# Patient Record
Sex: FEMALE | Race: WHITE | NOT HISPANIC OR LATINO | Employment: FULL TIME | URBAN - METROPOLITAN AREA
[De-identification: names, ages, dates, MRNs, and addresses within clinical notes are randomized per-mention and may not be internally consistent; named-entity substitution may affect disease eponyms.]

---

## 2017-01-10 ENCOUNTER — COMMUNICATION - HEALTHEAST (OUTPATIENT)
Dept: FAMILY MEDICINE | Facility: CLINIC | Age: 47
End: 2017-01-10

## 2017-01-10 DIAGNOSIS — R11.0 NAUSEA: ICD-10-CM

## 2017-01-20 ENCOUNTER — COMMUNICATION - HEALTHEAST (OUTPATIENT)
Dept: FAMILY MEDICINE | Facility: CLINIC | Age: 47
End: 2017-01-20

## 2017-01-20 DIAGNOSIS — R11.0 NAUSEA: ICD-10-CM

## 2017-04-03 ENCOUNTER — OFFICE VISIT - HEALTHEAST (OUTPATIENT)
Dept: FAMILY MEDICINE | Facility: CLINIC | Age: 47
End: 2017-04-03

## 2017-04-03 DIAGNOSIS — G43.909 MIGRAINE HEADACHE: ICD-10-CM

## 2017-04-03 DIAGNOSIS — R11.0 NAUSEA: ICD-10-CM

## 2017-04-08 ENCOUNTER — COMMUNICATION - HEALTHEAST (OUTPATIENT)
Dept: FAMILY MEDICINE | Facility: CLINIC | Age: 47
End: 2017-04-08

## 2017-04-08 DIAGNOSIS — G43.909 MIGRAINE HEADACHE: ICD-10-CM

## 2017-10-20 ENCOUNTER — HOSPITAL ENCOUNTER (OUTPATIENT)
Dept: MAMMOGRAPHY | Facility: CLINIC | Age: 47
Discharge: HOME | End: 2017-10-20
Attending: NURSE PRACTITIONER

## 2017-10-20 DIAGNOSIS — Z12.31 VISIT FOR SCREENING MAMMOGRAM: ICD-10-CM

## 2018-04-02 ENCOUNTER — COMMUNICATION - HEALTHEAST (OUTPATIENT)
Dept: FAMILY MEDICINE | Facility: CLINIC | Age: 48
End: 2018-04-02

## 2018-04-02 DIAGNOSIS — G43.909 MIGRAINE HEADACHE: ICD-10-CM

## 2018-04-02 DIAGNOSIS — R11.0 NAUSEA: ICD-10-CM

## 2018-04-09 ENCOUNTER — COMMUNICATION - HEALTHEAST (OUTPATIENT)
Dept: FAMILY MEDICINE | Facility: CLINIC | Age: 48
End: 2018-04-09

## 2018-04-09 DIAGNOSIS — G43.909 MIGRAINE HEADACHE: ICD-10-CM

## 2018-07-17 ENCOUNTER — COMMUNICATION - HEALTHEAST (OUTPATIENT)
Dept: FAMILY MEDICINE | Facility: CLINIC | Age: 48
End: 2018-07-17

## 2018-07-17 DIAGNOSIS — R11.0 NAUSEA: ICD-10-CM

## 2018-09-21 ENCOUNTER — HOSPITAL ENCOUNTER (OUTPATIENT)
Dept: MAMMOGRAPHY | Facility: CLINIC | Age: 48
Discharge: HOME | End: 2018-09-21
Attending: NURSE PRACTITIONER

## 2018-09-21 DIAGNOSIS — Z12.31 VISIT FOR SCREENING MAMMOGRAM: ICD-10-CM

## 2019-04-11 ENCOUNTER — COMMUNICATION - HEALTHEAST (OUTPATIENT)
Dept: FAMILY MEDICINE | Facility: CLINIC | Age: 49
End: 2019-04-11

## 2019-04-11 DIAGNOSIS — G43.909 MIGRAINE HEADACHE: ICD-10-CM

## 2019-07-26 ENCOUNTER — OFFICE VISIT - HEALTHEAST (OUTPATIENT)
Dept: FAMILY MEDICINE | Facility: CLINIC | Age: 49
End: 2019-07-26

## 2019-07-26 DIAGNOSIS — R11.0 NAUSEA: ICD-10-CM

## 2019-07-26 DIAGNOSIS — G47.00 INSOMNIA, UNSPECIFIED TYPE: ICD-10-CM

## 2019-07-26 DIAGNOSIS — Z00.00 HEALTHCARE MAINTENANCE: ICD-10-CM

## 2019-07-26 DIAGNOSIS — G43.909 MIGRAINE HEADACHE: ICD-10-CM

## 2019-07-26 DIAGNOSIS — F43.25 ADJUSTMENT REACTION WITH MIXED DISTURBANCE OF EMOTIONS AND CONDUCT: ICD-10-CM

## 2019-07-26 DIAGNOSIS — Z92.29 HISTORY OF POSTMENOPAUSAL HRT: ICD-10-CM

## 2019-07-26 DIAGNOSIS — Z11.4 SCREENING FOR HIV (HUMAN IMMUNODEFICIENCY VIRUS): ICD-10-CM

## 2019-07-26 DIAGNOSIS — D17.30 LIPOMA OF SKIN AND SUBCUTANEOUS TISSUE: ICD-10-CM

## 2019-07-26 LAB
25(OH)D3 SERPL-MCNC: 33.2 NG/ML (ref 30–80)
25(OH)D3 SERPL-MCNC: 33.2 NG/ML (ref 30–80)
ALBUMIN SERPL-MCNC: 4.4 G/DL (ref 3.5–5)
ALP SERPL-CCNC: 35 U/L (ref 45–120)
ALT SERPL W P-5'-P-CCNC: 16 U/L (ref 0–45)
ANION GAP SERPL CALCULATED.3IONS-SCNC: 7 MMOL/L (ref 5–18)
AST SERPL W P-5'-P-CCNC: 17 U/L (ref 0–40)
BILIRUB SERPL-MCNC: 0.5 MG/DL (ref 0–1)
BUN SERPL-MCNC: 19 MG/DL (ref 8–22)
CALCIUM SERPL-MCNC: 10.3 MG/DL (ref 8.5–10.5)
CHLORIDE BLD-SCNC: 108 MMOL/L (ref 98–107)
CHOLEST SERPL-MCNC: 154 MG/DL
CO2 SERPL-SCNC: 26 MMOL/L (ref 22–31)
CREAT SERPL-MCNC: 0.76 MG/DL (ref 0.6–1.1)
ERYTHROCYTE [DISTWIDTH] IN BLOOD BY AUTOMATED COUNT: 11.1 % (ref 11–14.5)
FASTING STATUS PATIENT QL REPORTED: YES
GFR SERPL CREATININE-BSD FRML MDRD: >60 ML/MIN/1.73M2
GLUCOSE BLD-MCNC: 98 MG/DL (ref 70–125)
HCT VFR BLD AUTO: 41 % (ref 35–47)
HDLC SERPL-MCNC: 61 MG/DL
HGB BLD-MCNC: 13.8 G/DL (ref 12–16)
HIV 1+2 AB+HIV1 P24 AG SERPL QL IA: NEGATIVE
LDLC SERPL CALC-MCNC: 85 MG/DL
MCH RBC QN AUTO: 32.4 PG (ref 27–34)
MCHC RBC AUTO-ENTMCNC: 33.6 G/DL (ref 32–36)
MCV RBC AUTO: 96 FL (ref 80–100)
PLATELET # BLD AUTO: 170 THOU/UL (ref 140–440)
PMV BLD AUTO: 9.7 FL (ref 7–10)
POTASSIUM BLD-SCNC: 4.5 MMOL/L (ref 3.5–5)
PROT SERPL-MCNC: 6.9 G/DL (ref 6–8)
RBC # BLD AUTO: 4.25 MILL/UL (ref 3.8–5.4)
SODIUM SERPL-SCNC: 141 MMOL/L (ref 136–145)
TRIGL SERPL-MCNC: 41 MG/DL
TSH SERPL DL<=0.005 MIU/L-ACNC: 1.35 UIU/ML (ref 0.3–5)
WBC: 4.8 THOU/UL (ref 4–11)

## 2019-07-26 ASSESSMENT — MIFFLIN-ST. JEOR: SCORE: 1330.55

## 2019-07-29 ENCOUNTER — COMMUNICATION - HEALTHEAST (OUTPATIENT)
Dept: FAMILY MEDICINE | Facility: CLINIC | Age: 49
End: 2019-07-29

## 2019-07-29 DIAGNOSIS — F32.9 CURRENT EPISODE OF MAJOR DEPRESSIVE DISORDER WITHOUT PRIOR EPISODE, UNSPECIFIED DEPRESSION EPISODE SEVERITY: ICD-10-CM

## 2019-07-29 DIAGNOSIS — F41.1 GENERALIZED ANXIETY DISORDER: ICD-10-CM

## 2019-08-29 ENCOUNTER — COMMUNICATION - HEALTHEAST (OUTPATIENT)
Dept: FAMILY MEDICINE | Facility: CLINIC | Age: 49
End: 2019-08-29

## 2019-08-29 DIAGNOSIS — F41.1 GENERALIZED ANXIETY DISORDER: ICD-10-CM

## 2019-08-29 DIAGNOSIS — F32.9 CURRENT EPISODE OF MAJOR DEPRESSIVE DISORDER WITHOUT PRIOR EPISODE, UNSPECIFIED DEPRESSION EPISODE SEVERITY: ICD-10-CM

## 2019-08-30 ENCOUNTER — COMMUNICATION - HEALTHEAST (OUTPATIENT)
Dept: FAMILY MEDICINE | Facility: CLINIC | Age: 49
End: 2019-08-30

## 2019-08-30 DIAGNOSIS — R11.0 NAUSEA: ICD-10-CM

## 2019-09-26 ENCOUNTER — COMMUNICATION - HEALTHEAST (OUTPATIENT)
Dept: FAMILY MEDICINE | Facility: CLINIC | Age: 49
End: 2019-09-26

## 2019-09-26 DIAGNOSIS — F41.1 GENERALIZED ANXIETY DISORDER: ICD-10-CM

## 2019-09-26 DIAGNOSIS — F32.9 CURRENT EPISODE OF MAJOR DEPRESSIVE DISORDER WITHOUT PRIOR EPISODE, UNSPECIFIED DEPRESSION EPISODE SEVERITY: ICD-10-CM

## 2019-11-27 ENCOUNTER — COMMUNICATION - HEALTHEAST (OUTPATIENT)
Dept: FAMILY MEDICINE | Facility: CLINIC | Age: 49
End: 2019-11-27

## 2019-11-27 DIAGNOSIS — G47.00 INSOMNIA, UNSPECIFIED TYPE: ICD-10-CM

## 2019-11-27 DIAGNOSIS — F32.A DEPRESSION, UNSPECIFIED DEPRESSION TYPE: ICD-10-CM

## 2019-11-27 DIAGNOSIS — F41.9 ANXIETY: ICD-10-CM

## 2019-12-13 ENCOUNTER — COMMUNICATION - HEALTHEAST (OUTPATIENT)
Dept: FAMILY MEDICINE | Facility: CLINIC | Age: 49
End: 2019-12-13

## 2019-12-13 DIAGNOSIS — F32.A DEPRESSION, UNSPECIFIED DEPRESSION TYPE: ICD-10-CM

## 2019-12-13 DIAGNOSIS — F41.9 ANXIETY: ICD-10-CM

## 2020-03-12 ENCOUNTER — COMMUNICATION - HEALTHEAST (OUTPATIENT)
Dept: FAMILY MEDICINE | Facility: CLINIC | Age: 50
End: 2020-03-12

## 2020-03-12 DIAGNOSIS — G47.00 INSOMNIA, UNSPECIFIED TYPE: ICD-10-CM

## 2020-04-01 ENCOUNTER — COMMUNICATION - HEALTHEAST (OUTPATIENT)
Dept: FAMILY MEDICINE | Facility: CLINIC | Age: 50
End: 2020-04-01

## 2020-04-01 DIAGNOSIS — G43.909 MIGRAINE HEADACHE: ICD-10-CM

## 2020-05-07 ENCOUNTER — COMMUNICATION - HEALTHEAST (OUTPATIENT)
Dept: FAMILY MEDICINE | Facility: CLINIC | Age: 50
End: 2020-05-07

## 2020-05-07 DIAGNOSIS — Z92.29 HISTORY OF POSTMENOPAUSAL HRT: ICD-10-CM

## 2020-05-07 DIAGNOSIS — G47.00 INSOMNIA, UNSPECIFIED TYPE: ICD-10-CM

## 2020-05-07 DIAGNOSIS — F41.9 ANXIETY: ICD-10-CM

## 2020-05-07 DIAGNOSIS — R11.0 NAUSEA: ICD-10-CM

## 2020-05-07 DIAGNOSIS — G43.909 MIGRAINE HEADACHE: ICD-10-CM

## 2020-05-07 DIAGNOSIS — F32.A DEPRESSION, UNSPECIFIED DEPRESSION TYPE: ICD-10-CM

## 2020-05-07 RX ORDER — ONDANSETRON 4 MG/1
TABLET, FILM COATED ORAL
Qty: 30 TABLET | Refills: 11 | Status: SHIPPED | OUTPATIENT
Start: 2020-05-07 | End: 2021-09-09

## 2020-05-07 RX ORDER — HYDROXYZINE HYDROCHLORIDE 25 MG/1
TABLET, FILM COATED ORAL
Qty: 90 TABLET | Refills: 1 | Status: SHIPPED | OUTPATIENT
Start: 2020-05-07 | End: 2021-09-09

## 2020-05-07 RX ORDER — ESTRADIOL 0.05 MG/D
PATCH, EXTENDED RELEASE TRANSDERMAL
Qty: 24 PATCH | Refills: 3 | Status: SHIPPED | OUTPATIENT
Start: 2020-05-07 | End: 2021-08-23

## 2020-05-08 ENCOUNTER — COMMUNICATION - HEALTHEAST (OUTPATIENT)
Dept: FAMILY MEDICINE | Facility: CLINIC | Age: 50
End: 2020-05-08

## 2020-05-08 DIAGNOSIS — G43.909 MIGRAINE HEADACHE: ICD-10-CM

## 2020-05-08 RX ORDER — ZOLMITRIPTAN 5 MG/1
TABLET, FILM COATED ORAL
Qty: 36 TABLET | Refills: 3 | Status: SHIPPED | OUTPATIENT
Start: 2020-05-08 | End: 2021-07-19

## 2020-09-04 ENCOUNTER — COMMUNICATION - HEALTHEAST (OUTPATIENT)
Dept: FAMILY MEDICINE | Facility: CLINIC | Age: 50
End: 2020-09-04

## 2020-09-04 ENCOUNTER — HOSPITAL ENCOUNTER (OUTPATIENT)
Dept: MAMMOGRAPHY | Facility: CLINIC | Age: 50
Discharge: HOME | End: 2020-09-04

## 2020-09-04 DIAGNOSIS — F41.9 ANXIETY: ICD-10-CM

## 2020-09-04 DIAGNOSIS — Z12.31 VISIT FOR SCREENING MAMMOGRAM: ICD-10-CM

## 2020-09-04 DIAGNOSIS — F32.A DEPRESSION, UNSPECIFIED DEPRESSION TYPE: ICD-10-CM

## 2020-09-06 RX ORDER — ESCITALOPRAM OXALATE 20 MG/1
TABLET ORAL
Qty: 90 TABLET | Refills: 3 | Status: SHIPPED | OUTPATIENT
Start: 2020-09-06 | End: 2021-09-09

## 2021-02-22 ENCOUNTER — RECORDS - HEALTHEAST (OUTPATIENT)
Dept: LAB | Facility: CLINIC | Age: 51
End: 2021-02-22

## 2021-02-24 LAB
GAMMA INTERFERON BACKGROUND BLD IA-ACNC: 0.09 IU/ML
M TB IFN-G BLD-IMP: NEGATIVE
MITOGEN IGNF BCKGRD COR BLD-ACNC: 0.05 IU/ML
MITOGEN IGNF BCKGRD COR BLD-ACNC: 0.06 IU/ML
QTF INTERPRETATION: NORMAL
QTF MITOGEN - NIL: 7.71 IU/ML

## 2021-05-25 ENCOUNTER — RECORDS - HEALTHEAST (OUTPATIENT)
Dept: ADMINISTRATIVE | Facility: CLINIC | Age: 51
End: 2021-05-25

## 2021-05-27 NOTE — TELEPHONE ENCOUNTER
RN cannot approve Refill Request    RN can NOT refill this medication PCP messaged that patient is overdue for Office Visit. Last office visit: 4/3/2017 Edith Bahena MD Last Physical: Visit date not found Last MTM visit: Visit date not found Last visit same specialty: 4/3/2017 Edith Bahena MD.  Next visit within 3 mo: Visit date not found  Next physical within 3 mo: Visit date not found      Cindy Sneed, [facility] Triage/Med Refill 4/11/2019    Requested Prescriptions   Pending Prescriptions Disp Refills     ZOLMitriptan (ZOMIG) 5 MG tablet [Pharmacy Med Name: ZOLMITRIPTAN 5MG TABLETS] 12 tablet 0     Sig: TAKE ONE TABLET BY MOUTH EVERY 2 HOURS AS NEEDED FOR MIGRAINE. MAX OF 2 TABLETS DAILY       Triptans Refill Protocol Failed - 4/11/2019  3:13 AM        Failed - PCP or prescribing provider visit in past 12 months       Last office visit with prescriber/PCP: 4/3/2017 Edith Bahena MD OR same dept: Visit date not found OR same specialty: 4/3/2017 Edith Bahena MD  Last physical: Visit date not found Last MTM visit: Visit date not found   Next visit within 3 mo: Visit date not found  Next physical within 3 mo: Visit date not found  Prescriber OR PCP: Edith Bahena MD  Last diagnosis associated with med order: 1. Migraine headache  - ZOLMitriptan (ZOMIG) 5 MG tablet [Pharmacy Med Name: ZOLMITRIPTAN 5MG TABLETS]; TAKE ONE TABLET BY MOUTH EVERY 2 HOURS AS NEEDED FOR MIGRAINE. MAX OF 2 TABLETS DAILY  Dispense: 12 tablet; Refill: 0    If protocol passes may refill for 12 months if within 3 months of last provider visit (or a total of 15 months).

## 2021-05-30 ENCOUNTER — RECORDS - HEALTHEAST (OUTPATIENT)
Dept: ADMINISTRATIVE | Facility: CLINIC | Age: 51
End: 2021-05-30

## 2021-05-30 VITALS — WEIGHT: 143.13 LBS | BODY MASS INDEX: 23.82 KG/M2

## 2021-05-30 NOTE — PROGRESS NOTES
Assessment/Plan:     1. Migraine headache  ZOLMitriptan (ZOMIG) 5 MG tablet    DISCONTINUED: ZOLMitriptan (ZOMIG) 5 MG tablet    DISCONTINUED: ZOLMitriptan (ZOMIG) 5 MG tablet   2. Screening for HIV (human immunodeficiency virus)  HIV Antigen/Antibody Screening [facility]   3. Nausea  ondansetron (ZOFRAN) 4 MG tablet   4. Insomnia, unspecified type  hydrOXYzine HCl (ATARAX) 25 MG tablet   5. History of postmenopausal HRT  estradiol (VIVELLE-DOT) 0.05 mg/24 hr   6. 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 Lipoma of skin and subcutaneous tissue         Diagnoses and all orders for this visit:    Migraine headache  -     ZOLMitriptan (ZOMIG) 5 MG tablet; Take 1 tablet (5 mg total) by mouth every 2 (two) hours as needed for migraine (Maximum of 2 tablets per day.).  Dispense: 12 tablet; Refill: 11  -Refill provided on Zomig.  Recommend follow-up in 1 year    Screening for HIV (human immunodeficiency virus)  -     HIV Antigen/Antibody Screening [place]    Nausea  -     ondansetron (ZOFRAN) 4 MG tablet; TAKE 1 TABLET BY MOUTH EVERY 8 HOURS AS NEEDED FOR NAUSEA OR VOMITING  Dispense: 30 tablet; Refill: 11    Insomnia, unspecified type  -     hydrOXYzine HCl (ATARAX) 25 MG tablet; Take 1 tablet every 6 hours as needed for anxiety/sleep  Dispense: 30 tablet; Refill: 1  -Counseled on use of medication and side effects.  She will also continue to use melatonin as needed encouraged her to seek a group support and/or counseling.  Provided emotional support today in clinic    History of postmenopausal HRT  -     estradiol (VIVELLE-DOT) 0.05 mg/24 hr; SOHAN 1 PA EXT TO THE SKIN 2 TIMES A WEEK  Dispense: 24 patch; Refill: 3  -She will follow-up with gynecologist in September    Healthcare maintenance  -     Lipid [place]  FASTING  -     Comprehensive Metabolic Panel  -     Thyroid Stimulating Hormone (TSH)  -     Vitamin D, Total (25-Hydroxy)  -     HM2(CBC w/o Differential)    Adjustment reaction with mixed disturbance of emotions and conduct  Provided emotional support.  Encouraged her to consider support group/ or individual counseling for grief.    Lipoma of skin and subcutaneous tissue  Discussed she could see general surgeon to discuss excision of lipomas             Subjective:      Alisia Garcia is a 48 y.o. female who comes in today for medication check and refill.  She has not been seen since April 2017.  Reviewed her health history.  She has history of migraine headaches.  Frequency of occurrence is variable.  Historically they have been associated with triggers such as increased stress and lack of sleep.  She is followed by a gynecologist who has her on estradiol patch.  Starting the patch is also made a big difference in her migraine headaches.  When migraines occur, she is Zomig.  She frequently experiences nausea with her migraines, so she also uses Zofran to help relieve the nausea.  No adverse side effects with the medications and they remain effective.  She has been having more migraines frequently because she has been under increased stress and not sleeping well.  She reports that her mother passed away in March.  Her mother developed lung cancer and by the time the cancer was found it had already metastasized to her brain.  Patient took time off of work to care for her mother in her home and her mother passed away in her own home.  Patient becomes tearful when discussing this.  She reports that she is still going through the grieving process.  Patient also reports that a few weeks ago her son who is 23 years old attempted suicide and is now an inpatient at [facility].  She is not seeing a therapist.  Feels that she is able to deal with her emotions and that they are normal therefore what someone who is  grieving should be experiencing.  She has started taking melatonin for sleep.  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  She does not want any medications that would make her groggy.  We had discussed doing lab work when she comes in for her next office visit when she was seen in 2017.  She is fasting today and agrees to lab work.  On review of systems she also reports some concern about presence of multiple lipomas on both of her arms.  He seemed to be increasing in size and they do cause her some discomfort.  She wonders how they could be removed.  She is otherwise feeling well and review of systems is otherwise negative.  Medications and allergies are reviewed and updated.    Current Outpatient Medications   Medication Sig Dispense Refill     estradiol (VIVELLE-DOT) 0.05 mg/24 hr SOHAN 1 PA EXT TO THE SKIN 2 TIMES A WEEK 24 patch 3     MULTIVITAMIN ORAL Take 1 capsule by mouth daily.       ondansetron (ZOFRAN) 4 MG tablet TAKE 1 TABLET BY MOUTH EVERY 8 HOURS AS NEEDED FOR NAUSEA OR VOMITING 30 tablet 11     ZOLMitriptan (ZOMIG) 5 MG tablet Take 1 tablet (5 mg total) by mouth every 2 (two) hours as needed for migraine (Maximum of 2 tablets per day.). 12 tablet 11     hydrOXYzine HCl (ATARAX) 25 MG tablet Take 1 tablet every 6 hours as needed for anxiety/sleep 30 tablet 1     No current facility-administered medications for this visit.        Past Medical History, Family History, and Social History reviewed.  No past medical history on file.  Past Surgical History:   Procedure Laterality Date     BREAST BIOPSY Right 2008     HYSTERECTOMY  10/2009    Partial     OH TOTAL ABDOM HYSTERECTOMY      Description: Total Abdominal Hysterectomy;  Recorded: 02/28/2012;     Patient has no known allergies.  Family History   Problem Relation Age of Onset     Diabetes Father      Breast cancer  "Maternal Aunt 38     Cancer Mother         lung cancer     Social History     Socioeconomic History     Marital status:      Spouse name: Not on file     Number of children: Not on file     Years of education: Not on file     Highest education level: Not on file   Occupational History     Not on file   Social Needs     Financial resource strain: Not on file     Food insecurity:     Worry: Not on file     Inability: Not on file     Transportation needs:     Medical: Not on file     Non-medical: Not on file   Tobacco Use     Smoking status: Former Smoker     Smokeless tobacco: Never Used     Tobacco comment: quit 22 yrs ago   Substance and Sexual Activity     Alcohol use: Yes     Alcohol/week: 0.6 oz     Types: 1 Glasses of wine per week     Comment: rare      Drug use: No     Sexual activity: Not on file     Comment: hyst   Lifestyle     Physical activity:     Days per week: Not on file     Minutes per session: Not on file     Stress: Not on file   Relationships     Social connections:     Talks on phone: Not on file     Gets together: Not on file     Attends Christian service: Not on file     Active member of club or organization: Not on file     Attends meetings of clubs or organizations: Not on file     Relationship status: Not on file     Intimate partner violence:     Fear of current or ex partner: Not on file     Emotionally abused: Not on file     Physically abused: Not on file     Forced sexual activity: Not on file   Other Topics Concern     Not on file   Social History Narrative     Not on file         Review of systems is as stated in HPI, and the remainder of the 10 system review is otherwise negative.    Objective:     Vitals:    07/26/19 0941   BP: 117/68   Patient Site: Left Arm   Patient Position: Sitting   Cuff Size: Adult Regular   Pulse: 60   Temp: 98  F (36.7  C)   TempSrc: Oral   SpO2: 98%   Weight: 154 lb 4 oz (70 kg)   Height: 5' 5\" (1.651 m)    Body mass index is 25.67 kg/m .    General " appearance: alert, appears stated age and cooperative  Head: Normocephalic, without obvious abnormality, atraumatic  Lungs: clear to auscultation bilaterally  Heart: regular rate and rhythm, no murmur, occasional extra beat  Extremities: extremities normal, atraumatic, no cyanosis or edema  Skin: multiple lipomas present on arms  Neurologic: Grossly normal   Psych: normal mood, slightly irritable, tearful at times            This note has been dictated using voice recognition software. Any grammatical or context distortions are unintentional and inherent to the the software.

## 2021-05-31 ENCOUNTER — RECORDS - HEALTHEAST (OUTPATIENT)
Dept: ADMINISTRATIVE | Facility: CLINIC | Age: 51
End: 2021-05-31

## 2021-06-01 NOTE — TELEPHONE ENCOUNTER
Refill Approved    Rx renewed per Medication Renewal Policy. Medication was last renewed on 8/30/19.    Katt Garcia, [facility] Triage/Med Refill 9/27/2019     Requested Prescriptions   Pending Prescriptions Disp Refills     escitalopram oxalate (LEXAPRO) 10 MG tablet [Pharmacy Med Name: ESCITALOPRAM 10MG TABLETS] 90 tablet 2     Sig: TAKE 1 TABLET(10 MG) BY MOUTH DAILY       SSRI Refill Protocol  Passed - 9/26/2019  9:00 PM        Passed - PCP or prescribing provider visit in last year     Last office visit with prescriber/PCP: 7/26/2019 Edith Bahena MD OR same dept: 7/26/2019 Edith Bahena MD OR same specialty: 7/26/2019 Edith Bahena MD  Last physical: Visit date not found Last MTM visit: Visit date not found   Next visit within 3 mo: Visit date not found  Next physical within 3 mo: Visit date not found  Prescriber OR PCP: Edith Bahena MD  Last diagnosis associated with med order: 1. Generalized anxiety disorder  - escitalopram oxalate (LEXAPRO) 10 MG tablet [Pharmacy Med Name: ESCITALOPRAM 10MG TABLETS]; TAKE 1 TABLET(10 MG) BY MOUTH DAILY  Dispense: 90 tablet; Refill: 2    2. Current episode of major depressive disorder without prior episode, unspecified depression episode severity  - escitalopram oxalate (LEXAPRO) 10 MG tablet [Pharmacy Med Name: ESCITALOPRAM 10MG TABLETS]; TAKE 1 TABLET(10 MG) BY MOUTH DAILY  Dispense: 90 tablet; Refill: 2    If protocol passes may refill for 12 months if within 3 months of last provider visit (or a total of 15 months).

## 2021-06-03 VITALS — HEIGHT: 65 IN | BODY MASS INDEX: 25.7 KG/M2 | WEIGHT: 154.25 LBS

## 2021-06-09 NOTE — PROGRESS NOTES
Assessment  1. Migraine headache  ZOLMitriptan (ZOMIG) 5 MG tablet   2. Nausea  ondansetron (ZOFRAN) 4 MG tablet       Plan    1.  Migraine headaches: Refill provided on Zomig.  Follow-up in 1 year.  2.  Nausea: Refill provided on Zofran.  Follow-up in 1 year.  3.  Healthcare maintenance: No longer needs Pap as she had hysterectomy.  Vaccines up-to-date.  Fasting labs last year were normal.  She declines recheck this year.  Suggest recheck of lipids and glucose in 1-2 years      Subjective  Alisia Garcia is a 46 y.o. female comes in today for medication check and refill.  She has history of migraine headaches.  Frequency of occurrence is variable.  Historically have been associated with triggers such as increased stress and lack of sleep.  Since she was seen last year for medication check, she did have her yearly cycle with her gynecologist.  She was started on an estradiol patch.  She reports that this has significantly helped with a lot of her symptoms.  She was having concerns with weight gain and fatigue.  The hormones have helped with that.  She is getting better rest.  She is having less frequent migraine headaches.  Overall feels great and has no particular concerns or questions today.  When migraines occur, she uses Zomig.  She frequently experiences nausea with her migraines so she uses Zofran for this.  No adverse side effects with medications and they remain effective.  Review of systems is otherwise negative.  She has not had any other changes in health or family history.  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  No other concerns today.    Current Outpatient Prescriptions   Medication Sig Dispense Refill     estradiol (VIVELLE-DOT) 0.05 mg/24 hr SOHAN 1 PA EXT TO THE SKIN 2 TIMES A WEEK  3     MULTIVITAMIN ORAL Take 1 capsule by mouth daily.       ondansetron (ZOFRAN) 4 MG tablet TAKE 1 TABLET BY MOUTH EVERY 8 HOURS AS NEEDED FOR NAUSEA OR VOMITING 30 tablet 11      ZOLMitriptan (ZOMIG) 5 MG tablet Take 1 tablet (5 mg total) by mouth every 2 (two) hours as needed for migraine (max of 2 tablets per day). 12 tablet 11     ZOLMitriptan (ZOMIG) 5 MG tablet TAKE 1 TABLET BY MOUTH AS NEEDED FOR MIGRAINE. MAY REPEAT AFTER 2 HOURS IF NEEDED 12 tablet 11     No current facility-administered medications for this visit.          Objective   /70 (Patient Site: Left Arm, Patient Position: Sitting, Cuff Size: Adult Regular)  Pulse 66  Wt 143 lb 2 oz (64.9 kg)  SpO2 99%  BMI 23.82 kg/m2      Gen: alert, no acute distress  HEENT;  NCAT, TMs normal, nose clear, OP clear  Neck: supple, no lymphadenopathy, thyroid normal  CV: RRR, no murmur  Resp: CTAB, no wheeze/crackles  Ext: warm, dry, no edema  Neuro: no focal abnormalities  Psych: mood appropriate, affect normal

## 2021-06-11 NOTE — TELEPHONE ENCOUNTER
RN cannot approve Refill Request    RN can NOT refill this medication PCP messaged that patient is overdue for Office Visit. Last office visit: Visit date not found Last Physical: Visit date not found Last MTM visit: Visit date not found Last visit same specialty: 7/26/2019 Edith Bahena MD.  Next visit within 3 mo: Visit date not found  Next physical within 3 mo: Visit date not found      Anuradha Chen, [facility] Triage/Med Refill 9/6/2020    Requested Prescriptions   Pending Prescriptions Disp Refills     escitalopram oxalate (LEXAPRO) 20 MG tablet [Pharmacy Med Name: ESCITALOPRAM 20MG TABLETS] 90 tablet 3     Sig: TAKE 1 TABLET(20 MG) BY MOUTH DAILY       SSRI Refill Protocol  Failed - 9/4/2020  3:13 AM        Failed - PCP or prescribing provider visit in last year     Last office visit with prescriber/PCP: Visit date not found OR same dept: Visit date not found OR same specialty: 7/26/2019 Edith Bahena MD  Last physical: Visit date not found Last MTM visit: Visit date not found   Next visit within 3 mo: Visit date not found  Next physical within 3 mo: Visit date not found  Prescriber OR PCP: Maegan Cota MD  Last diagnosis associated with med order: 1. Anxiety  - escitalopram oxalate (LEXAPRO) 20 MG tablet [Pharmacy Med Name: ESCITALOPRAM 20MG TABLETS]; TAKE 1 TABLET(20 MG) BY MOUTH DAILY  Dispense: 90 tablet; Refill: 3    2. Depression, unspecified depression type  - escitalopram oxalate (LEXAPRO) 20 MG tablet [Pharmacy Med Name: ESCITALOPRAM 20MG TABLETS]; TAKE 1 TABLET(20 MG) BY MOUTH DAILY  Dispense: 90 tablet; Refill: 3    If protocol passes may refill for 12 months if within 3 months of last provider visit (or a total of 15 months).

## 2021-06-17 NOTE — TELEPHONE ENCOUNTER
"Telephone Encounter by Loreta Goldberg at 5/8/2020 11:12 AM     Author: Loreta Goldberg Service: -- Author Type: --    Filed: 5/8/2020 11:20 AM Encounter Date: 5/8/2020 Status: Addendum    : Loreta Goldberg    Related Notes: Original Note by Loreta Goldberg filed at 5/8/2020 11:20 AM       Please advise no PA is needed for this medication and quantity. Patient allowed 36 tablets per 90 days.     Per Rx in Epic the quantity is 36 tabs per 90 days (there is a note to pharmacy to process a 90 days supply).     Called pharmacy, let them know to process 36 tabs per 90 days as this does not need a prior authorization.   Per technician how the Rx is written is not allowing him to process this as a 90 days supply. He stated that since the Rx says \"Max of 2 tablets daily\" they are processing this Rx for 36 tablets per 18 days.     Let them know the Rx states to process this as a 90 days supply.  Per technician he needs a new Rx sent without the \"max of 2 tablets daily\" (?).  He also said clinic can call to clarify Rx.     [id number] [place] [address] AT [place] [address]   #[phone]            "

## 2021-06-27 ENCOUNTER — HEALTH MAINTENANCE LETTER (OUTPATIENT)
Age: 51
End: 2021-06-27

## 2021-07-13 ENCOUNTER — RECORDS - HEALTHEAST (OUTPATIENT)
Dept: ADMINISTRATIVE | Facility: CLINIC | Age: 51
End: 2021-07-13

## 2021-07-14 DIAGNOSIS — G43.909 MIGRAINE HEADACHE: ICD-10-CM

## 2021-07-14 DIAGNOSIS — G43.909 MIGRAINE WITHOUT STATUS MIGRAINOSUS, NOT INTRACTABLE, UNSPECIFIED MIGRAINE TYPE: Primary | ICD-10-CM

## 2021-07-19 RX ORDER — ZOLMITRIPTAN 5 MG/1
TABLET, FILM COATED ORAL
Qty: 36 TABLET | Refills: 3 | Status: SHIPPED | OUTPATIENT
Start: 2021-07-19 | End: 2022-07-21

## 2021-07-19 NOTE — TELEPHONE ENCOUNTER
"Routing refill request to provider for review/approval because:  Labs not current:  Multiple  Visit greater than 1 year ago    Last Written Prescription Date:  5/8/20  Last Fill Quantity: 36,  # refills: 3   Last office visit provider:  7/26/19     Requested Prescriptions   Pending Prescriptions Disp Refills     ZOLMitriptan (ZOMIG) 5 MG tablet [Pharmacy Med Name: ZOLMITRIPTAN 5MG TABLETS] 36 tablet 3     Sig: TAKE 1 TABLET BY MOUTH EVERY 2 HOURS AS NEEDED FOR MIGRAINE. MAX OF 2 TABS PER DAY       Serotonin Agonists Failed - 7/14/2021  6:01 PM        Failed - Blood pressure under 140/90 in past 12 months     BP Readings from Last 3 Encounters:   No data found for BP                 Failed - Serotonin Agonist request needs review.     Please review patient's record. If patient has had 8 or more treatments in the past month, please forward to provider.          Failed - Recent (12 mo) or future (30 days) visit within the authorizing provider's specialty     Patient has had an office visit with the authorizing provider or a provider within the authorizing providers department within the previous 12 mos or has a future within next 30 days. See \"Patient Info\" tab in inbasket, or \"Choose Columns\" in Meds & Orders section of the refill encounter.              Passed - Medication is active on med list        Passed - Patient is age 18 or older        Passed - No active pregnancy on record        Passed - No positive pregnancy test in past 12 months             Sumit Han RN 07/19/21 7:37 AM  "

## 2021-07-21 ENCOUNTER — RECORDS - HEALTHEAST (OUTPATIENT)
Dept: ADMINISTRATIVE | Facility: CLINIC | Age: 51
End: 2021-07-21

## 2021-07-22 ENCOUNTER — RECORDS - HEALTHEAST (OUTPATIENT)
Dept: SCHEDULING | Facility: CLINIC | Age: 51
End: 2021-07-22

## 2021-07-22 DIAGNOSIS — Z12.31 OTHER SCREENING MAMMOGRAM: ICD-10-CM

## 2021-09-08 ENCOUNTER — VIRTUAL VISIT (OUTPATIENT)
Dept: FAMILY MEDICINE | Facility: CLINIC | Age: 51
End: 2021-09-08
Payer: COMMERCIAL

## 2021-09-08 DIAGNOSIS — G47.00 INSOMNIA, UNSPECIFIED TYPE: ICD-10-CM

## 2021-09-08 DIAGNOSIS — Z12.31 SCREENING MAMMOGRAM, ENCOUNTER FOR: ICD-10-CM

## 2021-09-08 DIAGNOSIS — G43.809 OTHER MIGRAINE WITHOUT STATUS MIGRAINOSUS, NOT INTRACTABLE: Primary | ICD-10-CM

## 2021-09-08 DIAGNOSIS — F41.9 ANXIETY: ICD-10-CM

## 2021-09-08 DIAGNOSIS — Z79.890 HORMONE REPLACEMENT THERAPY: ICD-10-CM

## 2021-09-08 DIAGNOSIS — Z13.220 LIPID SCREENING: ICD-10-CM

## 2021-09-08 DIAGNOSIS — F32.A DEPRESSION, UNSPECIFIED DEPRESSION TYPE: ICD-10-CM

## 2021-09-08 DIAGNOSIS — R11.0 NAUSEA: ICD-10-CM

## 2021-09-08 DIAGNOSIS — Z13.1 SCREENING FOR DIABETES MELLITUS: ICD-10-CM

## 2021-09-08 PROCEDURE — 99214 OFFICE O/P EST MOD 30 MIN: CPT | Mod: 95 | Performed by: FAMILY MEDICINE

## 2021-09-08 ASSESSMENT — PATIENT HEALTH QUESTIONNAIRE - PHQ9: SUM OF ALL RESPONSES TO PHQ QUESTIONS 1-9: 0

## 2021-09-08 NOTE — PROGRESS NOTES
Alisia is a 50 year old who is being evaluated via a billable video visit.      How would you like to obtain your AVS? MyChart  If the video visit is dropped, the invitation should be resent by: Text to cell phone: [phone]  Will anyone else be joining your video visit? No      Video Start Time: 4:10    Assessment & Plan     Other migraine without status migrainosus, not intractable  Continue Zomig for acute treatment of migraine headaches.  Encouraged stress management practices and self cares.  Consider seeing a neurologist in future due to increasing frequency of migraine headaches.  She would like to hold off on neurology consult at this time.  Continue Zofran as needed for nausea associated with migraines    Screening mammogram, encounter for    - MA Screen Bilateral w/[facility]    Lipid screening  Future orders placed for lipid screening  - Lipid panel reflex to direct LDL Fasting    Screening for diabetes mellitus  Future order placed for glucose screening  - Glucose    Nausea    - ondansetron (ZOFRAN) 4 MG tablet  Dispense: 30 tablet; Refill: 11    Hormone replacement therapy  Stable on current regimen.  This will be continued  - estradiol (VIVELLE-DOT) 0.05 MG/24HR bi-weekly patch  Dispense: 24 patch; Refill: 3    Anxiety  Doing well on current dose of Escitalopram  - escitalopram (LEXAPRO) 20 MG tablet  Dispense: 90 tablet; Refill: 3    Depression, unspecified depression type  Doing well on current dose of Escitalopram  - escitalopram (LEXAPRO) 20 MG tablet  Dispense: 90 tablet; Refill: 3    Insomnia, unspecified type  Uses this rarely  - hydrOXYzine (ATARAX) 25 MG tablet  Dispense: 90 tablet; Refill: 1                   No follow-ups on file.    Edith Bahena MD  [facility]   Alisia is a 50 year old who presents for the following health issues     HPI   female who comes in today for medication check and refill.  She has not been seen since July 2019.  Reviewed her  health history.  She has history of migraine headaches.  Frequency of occurrence is variable.    Has been experiencing them more frequently due to increased stress and lack of sleep.  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  Historically her migraines have been associated with triggers such as increased stress and lack of sleep and hormones.  She was started on estradiol patch a couple of years ago.  Starting the patch made a big difference in her migraine headaches.  When migraines occur, she uses Zomig.  She frequently experiences nausea with her migraines, so she also uses Zofran to help relieve the nausea.  After her last office visit she was started on Escitalopram due to anxiety and depression symptoms following some stressful life events.  She reports that she is doing well with the medication.  Dose is good and she will like to continue with it.  She uses hydroxyzine infrequently. No adverse side effects with any of her medications and they remain effective.    She is otherwise feeling well and review of systems is otherwise negative.  Medications and allergies are reviewed and updated.        Review of Systems         Objective           Vitals:  No vitals were obtained today due to virtual visit.    Physical Exam   GENERAL: Healthy, alert and no distress  EYES: Eyes grossly normal to inspection.  No discharge or erythema, or obvious scleral/conjunctival abnormalities.  RESP: No audible wheeze, cough, or visible cyanosis.  No visible retractions or increased work of breathing.    SKIN: Visible skin clear. No significant rash, abnormal pigmentation or lesions.  NEURO: Cranial nerves grossly intact.  Mentation and speech appropriate for age.  PSYCH: Mentation appears normal, affect normal/bright, judgement and insight intact, normal speech and appearance well-groomed.                Video-Visit Details    Type of service:  Video Visit    Video End Time:4:24    Originating Location (pt. Location):  Home    Distant Location (provider location):  [employer] [facility]     Platform used for Video Visit: Nacho

## 2021-09-09 RX ORDER — ONDANSETRON 4 MG/1
TABLET, FILM COATED ORAL
Qty: 30 TABLET | Refills: 11 | Status: SHIPPED | OUTPATIENT
Start: 2021-09-09 | End: 2022-03-04

## 2021-09-09 RX ORDER — ESTRADIOL 0.05 MG/D
PATCH, EXTENDED RELEASE TRANSDERMAL
Qty: 24 PATCH | Refills: 3 | Status: SHIPPED | OUTPATIENT
Start: 2021-09-09 | End: 2022-09-14

## 2021-09-09 RX ORDER — HYDROXYZINE HYDROCHLORIDE 25 MG/1
TABLET, FILM COATED ORAL
Qty: 90 TABLET | Refills: 1 | Status: SHIPPED | OUTPATIENT
Start: 2021-09-09 | End: 2022-03-06

## 2021-09-09 RX ORDER — ESCITALOPRAM OXALATE 20 MG/1
TABLET ORAL
Qty: 90 TABLET | Refills: 3 | Status: SHIPPED | OUTPATIENT
Start: 2021-09-09 | End: 2022-09-14

## 2021-09-13 ENCOUNTER — LAB (OUTPATIENT)
Dept: LAB | Facility: CLINIC | Age: 51
End: 2021-09-13
Payer: COMMERCIAL

## 2021-09-13 DIAGNOSIS — Z13.1 SCREENING FOR DIABETES MELLITUS: ICD-10-CM

## 2021-09-13 DIAGNOSIS — Z13.220 LIPID SCREENING: ICD-10-CM

## 2021-09-13 LAB
CHOLEST SERPL-MCNC: 165 MG/DL
FASTING STATUS PATIENT QL REPORTED: YES
FASTING STATUS PATIENT QL REPORTED: YES
GLUCOSE BLD-MCNC: 101 MG/DL (ref 70–125)
HDLC SERPL-MCNC: 56 MG/DL
LDLC SERPL CALC-MCNC: 93 MG/DL
TRIGL SERPL-MCNC: 82 MG/DL

## 2021-09-13 PROCEDURE — 82947 ASSAY GLUCOSE BLOOD QUANT: CPT

## 2021-09-13 PROCEDURE — 36415 COLL VENOUS BLD VENIPUNCTURE: CPT

## 2021-09-13 PROCEDURE — 82465 ASSAY BLD/SERUM CHOLESTEROL: CPT

## 2021-10-17 ENCOUNTER — HEALTH MAINTENANCE LETTER (OUTPATIENT)
Age: 51
End: 2021-10-17

## 2021-10-20 DIAGNOSIS — Z79.890 HORMONE REPLACEMENT THERAPY: ICD-10-CM

## 2021-10-20 RX ORDER — ESTRADIOL 0.05 MG/D
PATCH, EXTENDED RELEASE TRANSDERMAL
Qty: 24 PATCH | Refills: 3 | OUTPATIENT
Start: 2021-10-20

## 2021-11-02 ENCOUNTER — HOSPITAL ENCOUNTER (OUTPATIENT)
Dept: MAMMOGRAPHY | Facility: CLINIC | Age: 51
Discharge: HOME | End: 2021-11-02
Attending: FAMILY MEDICINE | Admitting: FAMILY MEDICINE
Payer: COMMERCIAL

## 2021-11-02 PROCEDURE — 77063 BREAST TOMOSYNTHESIS BI: CPT

## 2022-03-04 DIAGNOSIS — G47.00 INSOMNIA, UNSPECIFIED TYPE: ICD-10-CM

## 2022-03-04 DIAGNOSIS — R11.0 NAUSEA: ICD-10-CM

## 2022-03-04 RX ORDER — ONDANSETRON 4 MG/1
TABLET, FILM COATED ORAL
Qty: 30 TABLET | Refills: 11 | Status: SHIPPED | OUTPATIENT
Start: 2022-03-04 | End: 2022-09-14

## 2022-03-04 NOTE — TELEPHONE ENCOUNTER
Pending Prescriptions:                       Disp   Refills    ondansetron (ZOFRAN) 4 MG tablet          30 tab*11           Sig: [ONDANSETRON (ZOFRAN) 4 MG TABLET] TAKE 1 TABLET           BY MOUTH EVERY 8 HOURS AS NEEDED FOR NAUSEA OR           VOMITING

## 2022-03-06 RX ORDER — HYDROXYZINE HYDROCHLORIDE 25 MG/1
TABLET, FILM COATED ORAL
Qty: 90 TABLET | Refills: 1 | Status: SHIPPED | OUTPATIENT
Start: 2022-03-06 | End: 2022-09-14

## 2022-03-07 NOTE — TELEPHONE ENCOUNTER
"Last Written Prescription Date:  09/09/2021  Last Fill Quantity: 90,  # refills: 1   Last office visit provider:   09/08/2021    Requested Prescriptions   Pending Prescriptions Disp Refills     hydrOXYzine (ATARAX) 25 MG tablet 90 tablet 1     Sig: [HYDROXYZINE HCL (ATARAX) 25 MG TABLET] TAKE 1 TABLET BY MOUTH EVERY 6 HOURS AS NEEDED FOR ANXIETY OR SLEEP       Antihistamines Protocol Passed - 3/4/2022  1:55 PM        Passed - Recent (12 mo) or future (30 days) visit within the authorizing provider's specialty     Patient has had an office visit with the authorizing provider or a provider within the authorizing providers department within the previous 12 mos or has a future within next 30 days. See \"Patient Info\" tab in inbasket, or \"Choose Columns\" in Meds & Orders section of the refill encounter.              Passed - Patient is age 3 or older     Apply age and/or weight-based dosing for peds patients age 3 and older.    Forward request to provider for patients under the age of 3.          Passed - Medication is active on med list             Sharifa Ortiz 03/06/22 11:19 PM  "

## 2022-03-08 ENCOUNTER — TELEPHONE (OUTPATIENT)
Dept: FAMILY MEDICINE | Facility: CLINIC | Age: 52
End: 2022-03-08
Payer: COMMERCIAL

## 2022-03-15 NOTE — TELEPHONE ENCOUNTER
Central Prior Authorization Team   Phone: [phone]      PA Initiation    Medication: ondansetron (ZOFRAN) 4 MG tablet--INITIATED  Insurance Company: [employer] - Phone [phone] Fax [phone]  Pharmacy Filling the Rx: [id number] [place] [address] AT [place] [address]  Filling Pharmacy Phone: [phone]  Filling Pharmacy Fax:    Start Date: 3/15/2022      FAXED FORM

## 2022-03-17 NOTE — TELEPHONE ENCOUNTER
Central Prior Authorization Team   Phone: [phone]      PRIOR AUTHORIZATION DENIED    Medication: ondansetron (ZOFRAN) 4 MG tablet--DENIED    Denial Date: 3/16/2022    Denial Rational:        Appeal Information:      IF NO APPEAL IS NECESSARY PLEASE CLOSE ENCOUNTER

## 2022-07-21 DIAGNOSIS — G43.909 MIGRAINE WITHOUT STATUS MIGRAINOSUS, NOT INTRACTABLE, UNSPECIFIED MIGRAINE TYPE: ICD-10-CM

## 2022-07-22 RX ORDER — ZOLMITRIPTAN 5 MG/1
TABLET, FILM COATED ORAL
Qty: 36 TABLET | Refills: 3 | Status: SHIPPED | OUTPATIENT
Start: 2022-07-22 | End: 2022-09-14

## 2022-07-22 NOTE — TELEPHONE ENCOUNTER
"Routing refill request to provider for review/approval because:  SA review    Last Written Prescription Date:  7/19/21  Last Fill Quantity: 36,  # refills: 3   Last office visit provider:  9/8/21     Requested Prescriptions   Pending Prescriptions Disp Refills     ZOLMitriptan (ZOMIG) 5 MG tablet 36 tablet 3     Sig: TAKE 1 TABLET BY MOUTH EVERY 2 HOURS AS NEEDED FOR MIGRAINE. MAX OF 2 TABS PER DAY       Serotonin Agonists Failed - 7/22/2022 10:59 AM        Failed - Serotonin Agonist request needs review.     Please review patient's record. If patient has had 8 or more treatments in the past month, please forward to provider.          Passed - Blood pressure under 140/90 in past 12 months     BP Readings from Last 3 Encounters:   No data found for BP                 Passed - Recent (12 mo) or future (30 days) visit within the authorizing provider's specialty     Patient has had an office visit with the authorizing provider or a provider within the authorizing providers department within the previous 12 mos or has a future within next 30 days. See \"Patient Info\" tab in inbasket, or \"Choose Columns\" in Meds & Orders section of the refill encounter.              Passed - Medication is active on med list        Passed - Patient is age 18 or older        Passed - No active pregnancy on record        Passed - No positive pregnancy test in past 12 months             Sumit Han RN 07/22/22 10:59 AM  "

## 2022-07-23 ENCOUNTER — HEALTH MAINTENANCE LETTER (OUTPATIENT)
Age: 52
End: 2022-07-23

## 2022-09-14 ENCOUNTER — OFFICE VISIT (OUTPATIENT)
Dept: FAMILY MEDICINE | Facility: CLINIC | Age: 52
End: 2022-09-14
Payer: COMMERCIAL

## 2022-09-14 VITALS
HEART RATE: 88 BPM | TEMPERATURE: 98.1 F | SYSTOLIC BLOOD PRESSURE: 114 MMHG | OXYGEN SATURATION: 98 % | DIASTOLIC BLOOD PRESSURE: 70 MMHG

## 2022-09-14 DIAGNOSIS — G47.00 INSOMNIA, UNSPECIFIED TYPE: ICD-10-CM

## 2022-09-14 DIAGNOSIS — Z79.890 HORMONE REPLACEMENT THERAPY: ICD-10-CM

## 2022-09-14 DIAGNOSIS — F32.A DEPRESSION, UNSPECIFIED DEPRESSION TYPE: ICD-10-CM

## 2022-09-14 DIAGNOSIS — Z12.31 VISIT FOR SCREENING MAMMOGRAM: ICD-10-CM

## 2022-09-14 DIAGNOSIS — G43.909 MIGRAINE WITHOUT STATUS MIGRAINOSUS, NOT INTRACTABLE, UNSPECIFIED MIGRAINE TYPE: Primary | ICD-10-CM

## 2022-09-14 DIAGNOSIS — R11.0 NAUSEA: ICD-10-CM

## 2022-09-14 DIAGNOSIS — F41.9 ANXIETY: ICD-10-CM

## 2022-09-14 DIAGNOSIS — Z12.11 SCREEN FOR COLON CANCER: ICD-10-CM

## 2022-09-14 PROCEDURE — 99214 OFFICE O/P EST MOD 30 MIN: CPT | Performed by: FAMILY MEDICINE

## 2022-09-14 RX ORDER — ESCITALOPRAM OXALATE 20 MG/1
30 TABLET ORAL DAILY
Qty: 135 TABLET | Refills: 3 | Status: SHIPPED | OUTPATIENT
Start: 2022-09-14 | End: 2023-07-19

## 2022-09-14 RX ORDER — ZOLMITRIPTAN 5 MG/1
TABLET, FILM COATED ORAL
Qty: 36 TABLET | Refills: 3 | Status: SHIPPED | OUTPATIENT
Start: 2022-09-14 | End: 2023-07-19

## 2022-09-14 RX ORDER — ONDANSETRON 4 MG/1
TABLET, FILM COATED ORAL
Qty: 30 TABLET | Refills: 11 | Status: SHIPPED | OUTPATIENT
Start: 2022-09-14 | End: 2023-07-19

## 2022-09-14 RX ORDER — ESTRADIOL 0.05 MG/D
PATCH, EXTENDED RELEASE TRANSDERMAL
Qty: 24 PATCH | Refills: 3 | Status: SHIPPED | OUTPATIENT
Start: 2022-09-14 | End: 2022-09-29

## 2022-09-14 RX ORDER — HYDROXYZINE HYDROCHLORIDE 25 MG/1
TABLET, FILM COATED ORAL
Qty: 90 TABLET | Refills: 3 | Status: SHIPPED | OUTPATIENT
Start: 2022-09-14 | End: 2022-12-18

## 2022-09-14 ASSESSMENT — PAIN SCALES - GENERAL: PAINLEVEL: NO PAIN (0)

## 2022-09-14 ASSESSMENT — ENCOUNTER SYMPTOMS
HEADACHES: 0
HEMATURIA: 0
FREQUENCY: 0
CHILLS: 0
PARESTHESIAS: 0
COUGH: 0
CONSTIPATION: 0
ARTHRALGIAS: 0
PALPITATIONS: 0
MYALGIAS: 0
NAUSEA: 0
DIZZINESS: 0
DYSURIA: 0
JOINT SWELLING: 0
EYE PAIN: 0
ABDOMINAL PAIN: 0
HEARTBURN: 0
NERVOUS/ANXIOUS: 0
SHORTNESS OF BREATH: 0
SORE THROAT: 0
HEMATOCHEZIA: 0
BREAST MASS: 0
DIARRHEA: 0
WEAKNESS: 0
FEVER: 0

## 2022-09-14 ASSESSMENT — PATIENT HEALTH QUESTIONNAIRE - PHQ9
SUM OF ALL RESPONSES TO PHQ QUESTIONS 1-9: 3
10. IF YOU CHECKED OFF ANY PROBLEMS, HOW DIFFICULT HAVE THESE PROBLEMS MADE IT FOR YOU TO DO YOUR WORK, TAKE CARE OF THINGS AT HOME, OR GET ALONG WITH OTHER PEOPLE: SOMEWHAT DIFFICULT
SUM OF ALL RESPONSES TO PHQ QUESTIONS 1-9: 3

## 2022-09-15 ENCOUNTER — LAB (OUTPATIENT)
Dept: LAB | Facility: CLINIC | Age: 52
End: 2022-09-15
Payer: COMMERCIAL

## 2022-09-15 DIAGNOSIS — Z13.220 LIPID SCREENING: ICD-10-CM

## 2022-09-15 DIAGNOSIS — R63.5 WEIGHT GAIN: ICD-10-CM

## 2022-09-15 LAB
ALBUMIN SERPL-MCNC: 4 G/DL (ref 3.5–5)
ALP SERPL-CCNC: 40 U/L (ref 45–120)
ALT SERPL W P-5'-P-CCNC: <9 U/L (ref 0–45)
ANION GAP SERPL CALCULATED.3IONS-SCNC: 8 MMOL/L (ref 5–18)
AST SERPL W P-5'-P-CCNC: 13 U/L (ref 0–40)
BILIRUB SERPL-MCNC: 0.7 MG/DL (ref 0–1)
BUN SERPL-MCNC: 15 MG/DL (ref 8–22)
CALCIUM SERPL-MCNC: 9.3 MG/DL (ref 8.5–10.5)
CHLORIDE BLD-SCNC: 104 MMOL/L (ref 98–107)
CHOLEST SERPL-MCNC: 169 MG/DL
CO2 SERPL-SCNC: 25 MMOL/L (ref 22–31)
CREAT SERPL-MCNC: 0.72 MG/DL (ref 0.6–1.1)
FASTING STATUS PATIENT QL REPORTED: YES
GFR SERPL CREATININE-BSD FRML MDRD: >90 ML/MIN/1.73M2
GLUCOSE BLD-MCNC: 94 MG/DL (ref 70–125)
HDLC SERPL-MCNC: 62 MG/DL
LDLC SERPL CALC-MCNC: 96 MG/DL
POTASSIUM BLD-SCNC: 3.8 MMOL/L (ref 3.5–5)
PROT SERPL-MCNC: 7.1 G/DL (ref 6–8)
SODIUM SERPL-SCNC: 137 MMOL/L (ref 136–145)
TRIGL SERPL-MCNC: 57 MG/DL
TSH SERPL DL<=0.005 MIU/L-ACNC: 1.56 UIU/ML (ref 0.3–5)

## 2022-09-15 PROCEDURE — 80053 COMPREHEN METABOLIC PANEL: CPT

## 2022-09-15 PROCEDURE — 80061 LIPID PANEL: CPT

## 2022-09-15 PROCEDURE — 36415 COLL VENOUS BLD VENIPUNCTURE: CPT

## 2022-09-15 PROCEDURE — 84443 ASSAY THYROID STIM HORMONE: CPT

## 2022-09-16 ENCOUNTER — MYC MEDICAL ADVICE (OUTPATIENT)
Dept: FAMILY MEDICINE | Facility: CLINIC | Age: 52
End: 2022-09-16

## 2022-09-18 NOTE — PROGRESS NOTES
Assessment & Plan     Migraine without status migrainosus, not intractable, unspecified migraine type  Continue Zomig for acute treatment of migraine headaches.  Encouraged stress management practices and self cares.  Continue Zofran as needed for nausea associated with migraines  - ZOLMitriptan (ZOMIG) 5 MG tablet  Dispense: 36 tablet; Refill: 3    Screen for colon cancer  - Colonoscopy Screening  Referral      Anxiety  - escitalopram (LEXAPRO) 20 MG tablet  Dispense: 135 tablet; Refill: 3  Doing well on current dose of medication    Depression, unspecified depression type  - escitalopram (LEXAPRO) 20 MG tablet  Dispense: 135 tablet; Refill: 3  Doing well on current dose of medicine    Hormone replacement therapy  Stable on current regimen.  This will be continued  - estradiol (VIVELLE-DOT) 0.05 MG/24HR bi-weekly patch  Dispense: 24 patch; Refill: 3    Insomnia, unspecified type    - hydrOXYzine (ATARAX) 25 MG tablet  Dispense: 90 tablet; Refill: 3    Nausea  - ondansetron (ZOFRAN) 4 MG tablet  Dispense: 30 tablet; Refill: 11    Visit for screening mammogram    - MA Screen Bilateral w/[facility]                   No follow-ups on file.    Edith Bahena MD  [facility]    Anna Crump is a 52 year old, presenting for the following health issues:  Recheck Medication (Yearly renew medication) and Flu      Healthy Habits:     Getting at least 3 servings of Calcium per day:  Yes    Bi-annual eye exam:  Yes    Dental care twice a year:  Yes    Sleep apnea or symptoms of sleep apnea:  None    Diet:  Carbohydrate counting and Other    Frequency of exercise:  2-3 days/week    Duration of exercise:  15-30 minutes    Taking medications regularly:  Yes    Medication side effects:  Not applicable    PHQ-2 Total Score: 0    Additional concerns today:  No       Reviewed her health history.  She has history of migraine headaches.  Frequency of occurrence is variable.   Historically her  migraines have been associated with triggers such as increased stress and lack of sleep and hormones.  She was started on estradiol patch a couple of years ago.  Starting the patch made a big difference in her migraine headaches.  When migraines occur, she uses Zomig.  She frequently experiences nausea with her migraines, so she also uses Zofran to help relieve the nausea. She continues Escitalopram for anxiety and depression. She reports that she is doing well with the medication.  Dose is good and she will like to continue with it.  She uses hydroxyzine about 3 times per week for sleep.  It does cause drowsiness and fatigue. No significant adverse side effects with any of her medications and they remain effective.    She is otherwise feeling well and review of systems is otherwise negative.  She expresses some concern with weight gain and difficulty with weight loss.  She is doing intermittent fasting and has cut down on carbohydrates.  She is on her feet a lot at work.  Medications and allergies are reviewed and updated.           Review of Systems                    Review of Systems         Objective    /70 (BP Location: Left arm, Cuff Size: Adult Regular)   Pulse 88   Temp 98.1  F (36.7  C) (Oral)   SpO2 98%   There is no height or weight on file to calculate BMI.  Physical Exam   GENERAL: healthy, alert and no distress  PSYCH: mentation appears normal, affect normal/bright                    Answers for HPI/ROS submitted by the patient on 9/14/2022  If you checked off any problems, how difficult have these problems made it for you to do your work, take care of things at home, or get along with other people?: Somewhat difficult  PHQ9 TOTAL SCORE: 3

## 2022-09-29 DIAGNOSIS — Z79.890 HORMONE REPLACEMENT THERAPY: ICD-10-CM

## 2022-09-29 RX ORDER — ESTRADIOL 0.05 MG/D
PATCH, EXTENDED RELEASE TRANSDERMAL
Qty: 24 PATCH | Refills: 2 | Status: SHIPPED | OUTPATIENT
Start: 2022-09-29 | End: 2023-07-19

## 2022-10-01 ENCOUNTER — HEALTH MAINTENANCE LETTER (OUTPATIENT)
Age: 52
End: 2022-10-01

## 2022-10-07 ENCOUNTER — MYC MEDICAL ADVICE (OUTPATIENT)
Dept: FAMILY MEDICINE | Facility: CLINIC | Age: 52
End: 2022-10-07

## 2022-10-07 DIAGNOSIS — Z12.11 COLON CANCER SCREENING: Primary | ICD-10-CM

## 2022-10-28 ENCOUNTER — TELEPHONE (OUTPATIENT)
Dept: FAMILY MEDICINE | Facility: CLINIC | Age: 52
End: 2022-10-28

## 2022-10-28 NOTE — TELEPHONE ENCOUNTER
Pt is calling to inform care team that when she was in for her last visit 9/14/22, her flu shot was never entered in. Pt is needing to show proof of Flu vaccine.     Can this get entered into chart?

## 2022-10-28 NOTE — TELEPHONE ENCOUNTER
Per office visit note on 09/14/2022- there is not documentation that the influenza vaccine was ordered or administered.     Please advise.

## 2022-11-04 ENCOUNTER — HOSPITAL ENCOUNTER (OUTPATIENT)
Dept: MAMMOGRAPHY | Facility: CLINIC | Age: 52
Discharge: HOME | End: 2022-11-04
Attending: FAMILY MEDICINE | Admitting: FAMILY MEDICINE
Payer: COMMERCIAL

## 2022-11-04 DIAGNOSIS — Z12.31 VISIT FOR SCREENING MAMMOGRAM: ICD-10-CM

## 2022-11-04 PROCEDURE — 77067 SCR MAMMO BI INCL CAD: CPT

## 2022-12-18 DIAGNOSIS — G47.00 INSOMNIA, UNSPECIFIED TYPE: ICD-10-CM

## 2022-12-18 RX ORDER — HYDROXYZINE HYDROCHLORIDE 25 MG/1
TABLET, FILM COATED ORAL
Qty: 90 TABLET | Refills: 3 | Status: SHIPPED | OUTPATIENT
Start: 2022-12-18 | End: 2023-03-10

## 2022-12-18 NOTE — TELEPHONE ENCOUNTER
"Last Written Prescription Date:  9/14/2022  Last Fill Quantity: 90,  # refills: 3   Last office visit provider:  9/14/2022     Requested Prescriptions   Pending Prescriptions Disp Refills     hydrOXYzine (ATARAX) 25 MG tablet [Pharmacy Med Name: hydrOXYzine HCL 25MG TAB] 90 tablet 3     Sig: TAKE ONE TABLET BY MOUTH EVERY 6 HOURS AS NEEDED FOR ANXIETY OR SLEEP       Antihistamines Protocol Passed - 12/18/2022  5:04 AM        Passed - Recent (12 mo) or future (30 days) visit within the authorizing provider's specialty     Patient has had an office visit with the authorizing provider or a provider within the authorizing providers department within the previous 12 mos or has a future within next 30 days. See \"Patient Info\" tab in inbasket, or \"Choose Columns\" in Meds & Orders section of the refill encounter.              Passed - Patient is age 3 or older     Apply age and/or weight-based dosing for peds patients age 3 and older.    Forward request to provider for patients under the age of 3.          Passed - Medication is active on med list             Kailee Frost RN 12/18/22 5:59 PM  "

## 2023-01-12 ENCOUNTER — MYC MEDICAL ADVICE (OUTPATIENT)
Dept: FAMILY MEDICINE | Facility: CLINIC | Age: 53
End: 2023-01-12

## 2023-01-12 DIAGNOSIS — G43.819 OTHER MIGRAINE WITHOUT STATUS MIGRAINOSUS, INTRACTABLE: ICD-10-CM

## 2023-01-12 DIAGNOSIS — G43.909 MIGRAINE WITHOUT STATUS MIGRAINOSUS, NOT INTRACTABLE, UNSPECIFIED MIGRAINE TYPE: Primary | ICD-10-CM

## 2023-01-12 NOTE — PROGRESS NOTES
[facility]  Section of General Neurology  New Patient  Virtual Visit      Alisia Garcia MRN# [id number]   Age: 52 year old YOB: 1970     Requesting physician: Edith Montes     Reason for Consultation: Headaches           Assessment and Plan:   Assessment:  Alisia Garcia is pleasant 52 year old female who presents in consultation for headaches.  To review these sound to be classic for migraine headaches that she has had since ~age 12. They have currently worsened and are negatively impacting her life substantially.  Discussed common headache options.  I would favor against nortriptyline with selective serotonin reuptake inhibitor use for now, propranolol would have been my choice but she notes previously she had very low heart rates when younger.  Our last data in the computer was pulse 88 /70 and she feels this may have improved so this could perhaps be considered in the future.      Plan:  --Medrol dose pack to break headache cycle  --Magnesium oxide 400 mg Riboflavin (vitamin b2) 400 mg daily  --Topiramate to up titrate to 50 mg BID as in pt instructions.  Side effects discussed  --She will continue Zomig PRN +/- OTC options, discussed no more than 3 days a week ideally for OTC PRN options, could consider nurtec in the future as failed at least 3 other triptan choices previously.    --Follow up with me in ~3 months, sooner if these do not improve.  She will reach out with issues questions or changes in the meanwhile.             Sergey Crawford MD   of Neurology   [facility]      History of Presenting Symptoms:   Alisia Garcia is a 52 year old female who presents today for evaluation of headache  Medications are notable for lexapro for mood, zomig PRN    For years she has had headaches (since age 10-11).  Got 4 in a row last week.   Come from right or left back side of head and come up behind her  eye.  Does get nauseous.   Zomig usually always works but this was not working.   Previously failed maxalt, imitrex, relpax.    Zofran--does help   Trying tylenol/ibuprofen  Running out of medications  Stabbing pain one side or the other    Headache specific questions:  Location (unilateral/whole head/etc): behind either eye   Previously catamenial, now worsening. Still often second week per month    Frequency  Provoking factors--light  Visual changes --no   Nausea/vomiting:  Always nauseous   Feels hot.    Sensitivity to light/sound:  Yes, and to sound and smell      Liquid intake: does carry a big bottle around  Sleep (including ANTONIO screen)--modestly, hormone changes are hard on her.  [employer], does take call which is hard on her.   Family history of headaches --2 boys, 1 sister with similar ones.    Mood/Stress---doing better.   Medication overuse screen--briefly.    Previous medications tried:  Prophylactic: keto diet.   Never tried prophylaxis      Medrol dose pack:  Never tried this.     Headache related disability (days work missed, etc)--Has had to miss work  Previous imaging--years ago, normal.     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       Past Medical History:     Patient Active Problem List   Diagnosis     Migraine Headache     No past medical history on file.     Past Surgical History:     Past Surgical History:   Procedure Laterality Date     BIOPSY BREAST Right 2008     HYSTERECTOMY  10/2009    Partial     ZZC TOTAL ABDOM HYSTERECTOMY      Description: Total Abdominal Hysterectomy;  Recorded: 2012;        Social History:     Social History     Tobacco Use     Smoking status: Former     Packs/day: 0.00     Years: 0.00     Pack years: 0.00     Types: Cigarettes     Quit date: 1994     Years since quittin.0     Smokeless tobacco: Never     Tobacco comments:     quit 22 yrs ago   Substance Use Topics     Alcohol use: Yes     Alcohol/week: 1.0  standard drink     Comment: Rarely     Drug use: No        Family History:     Family History   Problem Relation Age of Onset     Diabetes Father      Breast Cancer Maternal Aunt 38.00     Cancer Mother         lung cancer     Other Cancer Mother         My mom was dx with metastatic small erica lung cancer 2019 &  6 weeks later 3/15/19        Medications:     Current Outpatient Medications   Medication Sig     escitalopram (LEXAPRO) 20 MG tablet Take 1.5 tablets (30 mg) by mouth daily     estradiol (VIVELLE-DOT) 0.05 MG/24HR bi-weekly patch APPLY 1 PATCH EXTERNALLY TO THE SKIN 2 TIMES A WEEK     hydrOXYzine (ATARAX) 25 MG tablet TAKE ONE TABLET BY MOUTH EVERY 6 HOURS AS NEEDED FOR ANXIETY OR SLEEP     MULTIVITAMIN ORAL [MULTIVITAMIN ORAL] Take 1 capsule by mouth daily.     ondansetron (ZOFRAN) 4 MG tablet [ONDANSETRON (ZOFRAN) 4 MG TABLET] TAKE 1 TABLET BY MOUTH EVERY 8 HOURS AS NEEDED FOR NAUSEA OR VOMITING     ZOLMitriptan (ZOMIG) 5 MG tablet TAKE 1 TABLET BY MOUTH EVERY 2 HOURS AS NEEDED FOR MIGRAINE. MAX OF 2 TABS PER DAY     No current facility-administered medications for this visit.        Allergies:   No Known Allergies     Review of Systems:   As noted above     Physical Exam:   Had video connection issues, briefly could see patient on video at start of visit but then later sound issues where telephone was utilized later in visit.          Data: Pertinent prior to visit   Imaging:  Previously normal per patient             The total time of this encounter today amounted to 35 minutes of time on video visit and 50 minutes in total. This time included time spent with the patient, prep work, ordering tests, and performing post visit documentation.

## 2023-01-12 NOTE — TELEPHONE ENCOUNTER
RECORDS RECEIVED FROM: Internal   REASON FOR VISIT: Other migraine without status migrainosus, intractable    Date of Appt: 01/16/2023   NOTES (FOR ALL VISITS) STATUS DETAILS   OFFICE NOTE from referring provider Internal 09/14/2022 Dr Bahena [facility]    OFFICE NOTE from other specialist N/A    DISCHARGE SUMMARY from hospital N/A    DISCHARGE REPORT from the ER N/A    OPERATIVE REPORT N/A    MEDICATION LIST N/A    IMAGING  (FOR ALL VISITS)     EMG N/A    EEG N/A    LUMBAR PUNCTURE N/A    FRANCISCO JAVIER SCAN N/A    ULTRASOUND (CAROTID BILAT) *VASCULAR* N/A    MRI (HEAD, NECK, SPINE) N/A    CT (HEAD, NECK, SPINE) N/A

## 2023-01-12 NOTE — TELEPHONE ENCOUNTER
Routed to PCP with pended referral to Neuro for her approval if appropriate. Nadja Thomas RN on 1/12/2023 at 11:35 AM

## 2023-01-16 ENCOUNTER — VIRTUAL VISIT (OUTPATIENT)
Dept: NEUROLOGY | Facility: CLINIC | Age: 53
End: 2023-01-16
Attending: FAMILY MEDICINE
Payer: COMMERCIAL

## 2023-01-16 ENCOUNTER — PRE VISIT (OUTPATIENT)
Dept: NEUROLOGY | Facility: CLINIC | Age: 53
End: 2023-01-16

## 2023-01-16 DIAGNOSIS — G43.819 OTHER MIGRAINE WITHOUT STATUS MIGRAINOSUS, INTRACTABLE: ICD-10-CM

## 2023-01-16 PROCEDURE — 99204 OFFICE O/P NEW MOD 45 MIN: CPT | Mod: 95 | Performed by: STUDENT IN AN ORGANIZED HEALTH CARE EDUCATION/TRAINING PROGRAM

## 2023-01-16 RX ORDER — TOPIRAMATE 50 MG/1
TABLET, FILM COATED ORAL
Qty: 60 TABLET | Refills: 3 | Status: SHIPPED | OUTPATIENT
Start: 2023-01-16 | End: 2023-03-07

## 2023-01-16 RX ORDER — TOPIRAMATE 25 MG/1
25 TABLET, FILM COATED ORAL 2 TIMES DAILY
Qty: 28 TABLET | Refills: 0 | Status: SHIPPED | OUTPATIENT
Start: 2023-01-16 | End: 2023-06-14

## 2023-01-16 RX ORDER — METHYLPREDNISOLONE 4 MG
TABLET, DOSE PACK ORAL
Qty: 21 TABLET | Refills: 0 | Status: SHIPPED | OUTPATIENT
Start: 2023-01-16 | End: 2023-06-14

## 2023-01-16 NOTE — PROGRESS NOTES
Alisia is a 52 year old who is being evaluated via a billable video visit.      How would you like to obtain your AVS? MyChart  If the video visit is dropped, the invitation should be resent by: Text to cell phone: [phone]  Will anyone else be joining your video visit? No        Video-Visit Details    Type of service:  Video Visit     Originating Location (pt. Location):  Work ([facility])    Distant Location (provider location):  off site  Time: 8:48-9:23 AM (35 minutes)  Platform used for Video Visit: MARIA ESTHER Zarco, MAXIME ([facility])

## 2023-01-16 NOTE — PATIENT INSTRUCTIONS
Magnesium oxide 400 mg Riboflavin (vitamin b2) 400 mg daily  Medrol dose pack   Continue zomig/OTC options as needed, OTC no more than 3-4 days a week ideally, dark quiet room as needed, continue to prioritize sleep, hydration.    Discussed nurtec as needed as another option, will stick with zomig for now    Topamax    --Start Topamax 25 mg twice a day x2 week then go up to 25 mg in AM and 50 mg at night x2 weeks then 50 mg BID  Most people tolerate topamax well.  We discussed these side effects/considerations including but not limited to: to stay well hydrated (kidney stones a possible side effect), peripheral tingling, brain fog. funny taste, weight loss, rarely glaucoma.  It is also pregnancy category D, and can lead to birth control pills not working. (at high doses, specifically estrogen component).    See me back in ~3 months, reach out sooner if they remain terrible.

## 2023-01-16 NOTE — LETTER
1/16/2023         RE: Alisia Garcia  [address] [address]        Dear Colleague,    Thank you for referring your patient, Alisia Garcia, to the [facility] [place]. Please see a copy of my visit note below.    [facility]  Section of General Neurology  New Patient  Virtual Visit      Alisia Garcia MRN# [id number]   Age: 52 year old YOB: 1970     Requesting physician: Edith Montes     Reason for Consultation: Headaches           Assessment and Plan:   Assessment:  Alisia Garcia is pleasant 52 year old female who presents in consultation for headaches.  To review these sound to be classic for migraine headaches that she has had since ~age 12. They have currently worsened and are negatively impacting her life substantially.  Discussed common headache options.  I would favor against nortriptyline with selective serotonin reuptake inhibitor use for now, propranolol would have been my choice but she notes previously she had very low heart rates when younger.  Our last data in the computer was pulse 88 /70 and she feels this may have improved so this could perhaps be considered in the future.      Plan:  --Medrol dose pack to break headache cycle  --Magnesium oxide 400 mg Riboflavin (vitamin b2) 400 mg daily  --Topiramate to up titrate to 50 mg BID as in pt instructions.  Side effects discussed  --She will continue Zomig PRN +/- OTC options, discussed no more than 3 days a week ideally for OTC PRN options, could consider nurtec in the future as failed at least 3 other triptan choices previously.    --Follow up with me in ~3 months, sooner if these do not improve.  She will reach out with issues questions or changes in the meanwhile.             Sergey Crawford MD   of Neurology   [facility]      History of Presenting Symptoms:   Alisia Garcia is a 52 year old  female who presents today for evaluation of headache  Medications are notable for lexapro for mood, zomig PRN    For years she has had headaches (since age 10-11).  Got 4 in a row last week.   Come from right or left back side of head and come up behind her eye.  Does get nauseous.   Zomig usually always works but this was not working.   Previously failed maxalt, imitrex, relpax.    Zofran--does help   Trying tylenol/ibuprofen  Running out of medications  Stabbing pain one side or the other    Headache specific questions:  Location (unilateral/whole head/etc): behind either eye   Previously catamenial, now worsening. Still often second week per month    Frequency  Provoking factors--light  Visual changes --no   Nausea/vomiting:  Always nauseous   Feels hot.    Sensitivity to light/sound:  Yes, and to sound and smell      Liquid intake: does carry a big bottle around  Sleep (including ANTONIO screen)--modestly, hormone changes are hard on her.  [employer], does take call which is hard on her.   Family history of headaches --2 boys, 1 sister with similar ones.    Mood/Stress---doing better.   Medication overuse screen--briefly.    Previous medications tried:  Prophylactic: keto diet.   Never tried prophylaxis      Medrol dose pack:  Never tried this.     Headache related disability (days work missed, etc)--Has had to miss work  Previous imaging--years ago, normal.     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       Past Medical History:     Patient Active Problem List   Diagnosis     Migraine Headache     No past medical history on file.     Past Surgical History:     Past Surgical History:   Procedure Laterality Date     BIOPSY BREAST Right 2008     HYSTERECTOMY  10/2009    Partial     ZZC TOTAL ABDOM HYSTERECTOMY      Description: Total Abdominal Hysterectomy;  Recorded: 02/28/2012;        Social History:     Social History     Tobacco Use     Smoking status: Former     Packs/day:  0.00     Years: 0.00     Pack years: 0.00     Types: Cigarettes     Quit date: 1994     Years since quittin.0     Smokeless tobacco: Never     Tobacco comments:     quit 22 yrs ago   Substance Use Topics     Alcohol use: Yes     Alcohol/week: 1.0 standard drink     Comment: Rarely     Drug use: No        Family History:     Family History   Problem Relation Age of Onset     Diabetes Father      Breast Cancer Maternal Aunt 38.00     Cancer Mother         lung cancer     Other Cancer Mother         My mom was dx with metastatic small erica lung cancer 2019 &  6 weeks later 3/15/19        Medications:     Current Outpatient Medications   Medication Sig     escitalopram (LEXAPRO) 20 MG tablet Take 1.5 tablets (30 mg) by mouth daily     estradiol (VIVELLE-DOT) 0.05 MG/24HR bi-weekly patch APPLY 1 PATCH EXTERNALLY TO THE SKIN 2 TIMES A WEEK     hydrOXYzine (ATARAX) 25 MG tablet TAKE ONE TABLET BY MOUTH EVERY 6 HOURS AS NEEDED FOR ANXIETY OR SLEEP     MULTIVITAMIN ORAL [MULTIVITAMIN ORAL] Take 1 capsule by mouth daily.     ondansetron (ZOFRAN) 4 MG tablet [ONDANSETRON (ZOFRAN) 4 MG TABLET] TAKE 1 TABLET BY MOUTH EVERY 8 HOURS AS NEEDED FOR NAUSEA OR VOMITING     ZOLMitriptan (ZOMIG) 5 MG tablet TAKE 1 TABLET BY MOUTH EVERY 2 HOURS AS NEEDED FOR MIGRAINE. MAX OF 2 TABS PER DAY     No current facility-administered medications for this visit.        Allergies:   No Known Allergies     Review of Systems:   As noted above     Physical Exam:   Had video connection issues, briefly could see patient on video at start of visit but then later sound issues where telephone was utilized later in visit.          Data: Pertinent prior to visit   Imaging:  Previously normal per patient             The total time of this encounter today amounted to 35 minutes of time on video visit and 50 minutes in total. This time included time spent with the patient, prep work, ordering tests, and performing post visit  documentation.      Alisia is a 52 year old who is being evaluated via a billable video visit.      How would you like to obtain your AVS? MyChart  If the video visit is dropped, the invitation should be resent by: Text to cell phone: [phone]  Will anyone else be joining your video visit? No        Video-Visit Details    Type of service:  Video Visit     Originating Location (pt. Location):  Work ([facility])    Distant Location (provider location):  off site  Time: 8:48-9:23 AM (35 minutes)  Platform used for Video Visit: MARIA ESTHER Zarco, MAXIME ([facility])          Again, thank you for allowing me to participate in the care of your patient.        Sincerely,        Chun Crawford MD

## 2023-01-26 DIAGNOSIS — G43.819 OTHER MIGRAINE WITHOUT STATUS MIGRAINOSUS, INTRACTABLE: Primary | ICD-10-CM

## 2023-01-27 ENCOUNTER — TELEPHONE (OUTPATIENT)
Dept: NEUROLOGY | Facility: CLINIC | Age: 53
End: 2023-01-27
Payer: COMMERCIAL

## 2023-01-27 NOTE — TELEPHONE ENCOUNTER
VORB okay to repeat dose of Ubrelvy in 2 hours if needed.     Called pharmacy and gave verbal to update script.     Abena CROWLEY RN, BSN  [facility]

## 2023-01-27 NOTE — TELEPHONE ENCOUNTER
Called pharmacy, they are needing clarification for if provider wants the ubrelvy to be just once per day for headache, or if the dose can be repeated 2 hours later.     Will route to provider to clarify.     Abena CROWLEY RN, BSN  [facility]

## 2023-01-27 NOTE — TELEPHONE ENCOUNTER
[facility] Call Center    Phone Message    May a detailed message be left on voicemail: yes     Reason for Call: Medication Question or concern regarding medication   Prescription Clarification  Name of Medication: ubrogepant (UBRELVY) 100 MG tablet  Prescribing Provider:     [facility]   What on the order needs clarification?      [facility] called to speak to provider to clarify directions for ubrogepant (UBRELVY) 100 MG tablet, please call [phone] to speak to a pharmacist             Action Taken: Other: cs neurology    Travel Screening: Not Applicable

## 2023-01-31 ENCOUNTER — TELEPHONE (OUTPATIENT)
Dept: NEUROLOGY | Facility: CLINIC | Age: 53
End: 2023-01-31
Payer: COMMERCIAL

## 2023-01-31 NOTE — TELEPHONE ENCOUNTER
[facility]    Fax: [phone]    Spec: [phone]    MO: [phone]

## 2023-02-03 NOTE — TELEPHONE ENCOUNTER
Central Prior Authorization Team  Phone: [phone]    PA Initiation    Medication: Ubrelvy 100mg tabs  Insurance Company: [employer] - Phone [phone] Fax [phone]  Pharmacy Filling the Rx: [facility] - [place], [place] - [address]  Filling Pharmacy Phone: [phone]  Filling Pharmacy Fax:    Start Date: 2/3/2023

## 2023-02-03 NOTE — TELEPHONE ENCOUNTER
Central Prior Authorization Team  Phone: [phone]    Prior Authorization Approval    Authorization Effective Date: 2/3/2023  Authorization Expiration Date: 8/2/2023  Medication: Ubrelvy 100mg tabs  Approved Dose/Quantity:   Reference #:     Insurance Company: [employer] - Phone [phone] Fax [phone]  Expected CoPay:       CoPay Card Available:      Foundation Assistance Needed:    Which Pharmacy is filling the prescription (Not needed for infusion/clinic administered): [facility] - [place], [place] - [facility] [address]   Pharmacy Notified: Yes  Patient Notified:

## 2023-03-06 DIAGNOSIS — G43.819 OTHER MIGRAINE WITHOUT STATUS MIGRAINOSUS, INTRACTABLE: ICD-10-CM

## 2023-03-07 DIAGNOSIS — G47.00 INSOMNIA, UNSPECIFIED TYPE: ICD-10-CM

## 2023-03-07 RX ORDER — TOPIRAMATE 50 MG/1
TABLET, FILM COATED ORAL
Qty: 180 TABLET | Refills: 1 | Status: SHIPPED | OUTPATIENT
Start: 2023-03-07 | End: 2023-06-14

## 2023-03-08 RX ORDER — HYDROXYZINE HYDROCHLORIDE 25 MG/1
TABLET, FILM COATED ORAL
Qty: 90 TABLET | Refills: 3 | OUTPATIENT
Start: 2023-03-08

## 2023-03-09 DIAGNOSIS — G47.00 INSOMNIA, UNSPECIFIED TYPE: ICD-10-CM

## 2023-03-10 RX ORDER — HYDROXYZINE HYDROCHLORIDE 25 MG/1
TABLET, FILM COATED ORAL
Qty: 90 TABLET | Refills: 3 | Status: SHIPPED | OUTPATIENT
Start: 2023-03-10 | End: 2023-05-23

## 2023-03-10 NOTE — TELEPHONE ENCOUNTER
"Last Written Prescription Date:  12/18/2022  Last Fill Quantity: 90,  # refills: 3   Last office visit provider:  9/14/2022     Requested Prescriptions   Pending Prescriptions Disp Refills     hydrOXYzine (ATARAX) 25 MG tablet [Pharmacy Med Name: hydrOXYzine HCL 25MG TAB] 90 tablet 3     Sig: TAKE ONE TABLET BY MOUTH EVERY 6 HOURS AS NEEDED FOR ANXIETY OR SLEEP       Antihistamines Protocol Passed - 3/10/2023 11:18 AM        Passed - Recent (12 mo) or future (30 days) visit within the authorizing provider's specialty     Patient has had an office visit with the authorizing provider or a provider within the authorizing providers department within the previous 12 mos or has a future within next 30 days. See \"Patient Info\" tab in inbasket, or \"Choose Columns\" in Meds & Orders section of the refill encounter.              Passed - Patient is age 3 or older     Apply age and/or weight-based dosing for peds patients age 3 and older.    Forward request to provider for patients under the age of 3.          Passed - Medication is active on med list             Ann Morgan RN 03/10/23 11:19 AM  "

## 2023-05-23 DIAGNOSIS — G47.00 INSOMNIA, UNSPECIFIED TYPE: ICD-10-CM

## 2023-05-23 RX ORDER — HYDROXYZINE HYDROCHLORIDE 25 MG/1
TABLET, FILM COATED ORAL
Qty: 90 TABLET | Refills: 3 | Status: SHIPPED | OUTPATIENT
Start: 2023-05-23 | End: 2023-07-19

## 2023-05-24 NOTE — TELEPHONE ENCOUNTER
"Last Written Prescription Date:  3/10/23  Last Fill Quantity: 90,  # refills: 3   Last office visit provider:  9/14/22     Requested Prescriptions   Pending Prescriptions Disp Refills     hydrOXYzine (ATARAX) 25 MG tablet [Pharmacy Med Name: hydrOXYzine HCL 25MG TAB] 90 tablet 3     Sig: TAKE ONE TABLET BY MOUTH EVERY 6 HOURS AS NEEDED FOR ANXIETY OR SLEEP       Antihistamines Protocol Passed - 5/23/2023  5:05 AM        Passed - Recent (12 mo) or future (30 days) visit within the authorizing provider's specialty     Patient has had an office visit with the authorizing provider or a provider within the authorizing providers department within the previous 12 mos or has a future within next 30 days. See \"Patient Info\" tab in inbasket, or \"Choose Columns\" in Meds & Orders section of the refill encounter.              Passed - Patient is age 3 or older     Apply age and/or weight-based dosing for peds patients age 3 and older.    Forward request to provider for patients under the age of 3.          Passed - Medication is active on med list             Katt Garcia RN 05/23/23 8:10 PM  "

## 2023-06-13 NOTE — PROGRESS NOTES
[facility]  Section of General Neurology  Return Patient  Virtual Visit    Alisia Garcia MRN# [id number]   Age: 52 year old YOB: 1970            Assessment and Plan:   Assessment:  Alisia Garcia is a pleasant 52 year old female seen in follow up today for migraine headaches.  They are improved in frequency and intensity on topiramate which has improved her ability to function at work, great news.  She is experiencing no side effects on the topamax.  She notes Ubrelvy helps as needed with less side effects than the previous 3 triptans trialed, excellent news.  Will see if slightly higher topamax can help even further.  All questions answered.     Plan:  Increase topiramate to 50 mg in   mg at night, side effects discussed  Continue Ubrelvy PRN  Follow up in 5-6 months sooner with issues questions or changes        Sergey Crawford MD   of Neurology   [facility]      Interval history:     Ubrelvy prior auth sent subsequently  Is at work at "[employer]"  Things are better.    Took a couple of months for things to improve, seemed to slowly improve where by May things are a lot better.  Only 2 in May and they are getting shorter in duration (now only ~3 days instead of 5-7 days).    Ubrelvy has less side effects than the triptans.  Headaches are still there but controlled, less frequent and intense.  Still cluster.   No known side effects of topiramate.  Doing fine and notices benefit as above.    Decreasing call may have helped too.    Seeing a lot of trauma at [place] lately.      A/P at previous visit  Alisia Garcia is pleasant 52 year old female who presents in consultation for headaches.  To review these sound to be classic for migraine headaches that she has had since ~age 12. They have currently worsened and are negatively impacting her life substantially.  Discussed common headache options.  I would favor against  nortriptyline with selective serotonin reuptake inhibitor use for now, propranolol would have been my choice but she notes previously she had very low heart rates when younger.  Our last data in the computer was pulse 88 /70 and she feels this may have improved so this could perhaps be considered in the future.      Plan:  --Medrol dose pack to break headache cycle  --Magnesium oxide 400 mg Riboflavin (vitamin b2) 400 mg daily  --Topiramate to up titrate to 50 mg BID as in pt instructions.  Side effects discussed  --She will continue Zomig PRN +/- OTC options, discussed no more than 3 days a week ideally for OTC PRN options, could consider nurtec in the future as failed at least 3 other triptan choices previously.    --Follow up with me in ~3 months, sooner if these do not improve.  She will reach out with issues questions or changes in the meanwhile.     Past Medical History:     Patient Active Problem List   Diagnosis     Migraine Headache     No past medical history on file.     Past Surgical History:     Past Surgical History:   Procedure Laterality Date     BIOPSY BREAST Right 2008     HYSTERECTOMY  10/2009    Partial     ZZC TOTAL ABDOM HYSTERECTOMY      Description: Total Abdominal Hysterectomy;  Recorded: 2012;        Social History:     Social History     Tobacco Use     Smoking status: Former     Packs/day: 0.00     Years: 0.00     Pack years: 0.00     Types: Cigarettes     Quit date: 1994     Years since quittin.4     Smokeless tobacco: Never     Tobacco comments:     quit 22 yrs ago   Substance Use Topics     Alcohol use: Yes     Alcohol/week: 1.0 standard drink of alcohol     Comment: Rarely     Drug use: No        Family History:     Family History   Problem Relation Age of Onset     Diabetes Father      Breast Cancer Maternal Aunt 38.00     Cancer Mother         lung cancer     Other Cancer Mother         My mom was dx with metastatic small erica lung cancer 2019 &  6 weeks  later 3/15/19        Medications:     Current Outpatient Medications   Medication Sig     escitalopram (LEXAPRO) 20 MG tablet Take 1.5 tablets (30 mg) by mouth daily     estradiol (VIVELLE-DOT) 0.05 MG/24HR bi-weekly patch APPLY 1 PATCH EXTERNALLY TO THE SKIN 2 TIMES A WEEK     hydrOXYzine (ATARAX) 25 MG tablet TAKE ONE TABLET BY MOUTH EVERY 6 HOURS AS NEEDED FOR ANXIETY OR SLEEP     methylPREDNISolone (MEDROL DOSEPAK) 4 MG tablet therapy pack Follow Package Directions     MULTIVITAMIN ORAL [MULTIVITAMIN ORAL] Take 1 capsule by mouth daily.     ondansetron (ZOFRAN) 4 MG tablet [ONDANSETRON (ZOFRAN) 4 MG TABLET] TAKE 1 TABLET BY MOUTH EVERY 8 HOURS AS NEEDED FOR NAUSEA OR VOMITING     topiramate (TOPAMAX) 25 MG tablet Take 1 tablet (25 mg) by mouth 2 times daily     topiramate (TOPAMAX) 50 MG tablet Take 50 mg twice daily     ubrogepant (UBRELVY) 100 MG tablet Take 1 tablet (100 mg) by mouth at onset of headache (bad migraine headache)     ZOLMitriptan (ZOMIG) 5 MG tablet TAKE 1 TABLET BY MOUTH EVERY 2 HOURS AS NEEDED FOR MIGRAINE. MAX OF 2 TABS PER DAY     No current facility-administered medications for this visit.        Allergies:   No Known Allergies     Review of Systems:   As noted above     Physical Exam:   General: Seated comfortably in no acute distress.  Neurologic:     Mental Status: Fully alert, attentive and oriented. Speech clear and fluent, no paraphasic errors.     Cranial Nerves:  Facial movements symmetric. Hearing not formally tested but intact to conversation.  No dysarthria.     Motor: No tremors or other abnormal movements observed.      Sensory:Not able to be tested virtually          Data: Pertinent prior to visit   Imaging:  Previously unrevealing per patient                  The total time of this encounter today amounted to 13 minutes of time on video visit and 21 minutes in total. This time included time spent with the patient, prep work, ordering tests, and performing post visit  documentation.

## 2023-06-14 ENCOUNTER — VIRTUAL VISIT (OUTPATIENT)
Dept: NEUROLOGY | Facility: CLINIC | Age: 53
End: 2023-06-14
Payer: COMMERCIAL

## 2023-06-14 DIAGNOSIS — G43.819 OTHER MIGRAINE WITHOUT STATUS MIGRAINOSUS, INTRACTABLE: Primary | ICD-10-CM

## 2023-06-14 PROCEDURE — 99213 OFFICE O/P EST LOW 20 MIN: CPT | Mod: VID | Performed by: STUDENT IN AN ORGANIZED HEALTH CARE EDUCATION/TRAINING PROGRAM

## 2023-06-14 RX ORDER — TOPIRAMATE 50 MG/1
TABLET, FILM COATED ORAL
Qty: 270 TABLET | Refills: 1 | Status: SHIPPED | OUTPATIENT
Start: 2023-06-14 | End: 2023-11-27

## 2023-06-14 NOTE — PROGRESS NOTES
Alisia is a 52 year old who is being evaluated via a billable video visit.      How would you like to obtain your AVS? MyChart  If the video visit is dropped, the invitation should be resent by: Text to cell phone: [phone]  Will anyone else be joining your video visit? No      Video-Visit Details    Type of service:  Video Visit     Originating Location (pt. Location): @ work in at [facility]  Distant Location (provider location):  On-site   Video details: 826 AM -839 AM (13 minutes)  Platform used for Video Visit: MARIA ESTHER Huang, [facility] ([facility])

## 2023-06-14 NOTE — LETTER
6/14/2023         RE: Alisia Garcia  [address] [facility] [address]        Dear Colleague,    Thank you for referring your patient, Alisia Garcia, to the [facility] [place]. Please see a copy of my visit note below.    [facility]  Section of General Neurology  Return Patient  Virtual Visit    Alisia Garcia MRN# [id number]   Age: 52 year old YOB: 1970            Assessment and Plan:   Assessment:  Alisia Garcia is a pleasant 52 year old female seen in follow up today for migraine headaches.  They are improved in frequency and intensity on topiramate which has improved her ability to function at work, great news.  She is experiencing no side effects on the topamax.  She notes Ubrelvy helps as needed with less side effects than the previous 3 triptans trialed, excellent news.  Will see if slightly higher topamax can help even further.  All questions answered.     Plan:  Increase topiramate to 50 mg in   mg at night, side effects discussed  Continue Ubrelvy PRN  Follow up in 5-6 months sooner with issues questions or changes        Sergey Crawford MD   of Neurology   [facility]      Interval history:     Ubrelvy prior auth sent subsequently  Is at work at [employer]  Things are better.    Took a couple of months for things to improve, seemed to slowly improve where by May things are a lot better.  Only 2 in May and they are getting shorter in duration (now only ~3 days instead of 5-7 days).    Ubrelvy has less side effects than the triptans.  Headaches are still there but controlled, less frequent and intense.  Still cluster.   No known side effects of topiramate.  Doing fine and notices benefit as above.    Decreasing call may have helped too.    Seeing a lot of trauma at [place] lately.      A/P at previous visit  Alisia Garcia is pleasant 52 year old female who presents in  consultation for headaches.  To review these sound to be classic for migraine headaches that she has had since ~age 12. They have currently worsened and are negatively impacting her life substantially.  Discussed common headache options.  I would favor against nortriptyline with selective serotonin reuptake inhibitor use for now, propranolol would have been my choice but she notes previously she had very low heart rates when younger.  Our last data in the computer was pulse 88 /70 and she feels this may have improved so this could perhaps be considered in the future.      Plan:  --Medrol dose pack to break headache cycle  --Magnesium oxide 400 mg Riboflavin (vitamin b2) 400 mg daily  --Topiramate to up titrate to 50 mg BID as in pt instructions.  Side effects discussed  --She will continue Zomig PRN +/- OTC options, discussed no more than 3 days a week ideally for OTC PRN options, could consider nurtec in the future as failed at least 3 other triptan choices previously.    --Follow up with me in ~3 months, sooner if these do not improve.  She will reach out with issues questions or changes in the meanwhile.     Past Medical History:     Patient Active Problem List   Diagnosis     Migraine Headache     No past medical history on file.     Past Surgical History:     Past Surgical History:   Procedure Laterality Date     BIOPSY BREAST Right 2008     HYSTERECTOMY  10/2009    Partial     ZZC TOTAL ABDOM HYSTERECTOMY      Description: Total Abdominal Hysterectomy;  Recorded: 2012;        Social History:     Social History     Tobacco Use     Smoking status: Former     Packs/day: 0.00     Years: 0.00     Pack years: 0.00     Types: Cigarettes     Quit date: 1994     Years since quittin.4     Smokeless tobacco: Never     Tobacco comments:     quit 22 yrs ago   Substance Use Topics     Alcohol use: Yes     Alcohol/week: 1.0 standard drink of alcohol     Comment: Rarely     Drug use: No        Family  History:     Family History   Problem Relation Age of Onset     Diabetes Father      Breast Cancer Maternal Aunt 38.00     Cancer Mother         lung cancer     Other Cancer Mother         My mom was dx with metastatic small erica lung cancer 2019 &  6 weeks later 3/15/19        Medications:     Current Outpatient Medications   Medication Sig     escitalopram (LEXAPRO) 20 MG tablet Take 1.5 tablets (30 mg) by mouth daily     estradiol (VIVELLE-DOT) 0.05 MG/24HR bi-weekly patch APPLY 1 PATCH EXTERNALLY TO THE SKIN 2 TIMES A WEEK     hydrOXYzine (ATARAX) 25 MG tablet TAKE ONE TABLET BY MOUTH EVERY 6 HOURS AS NEEDED FOR ANXIETY OR SLEEP     methylPREDNISolone (MEDROL DOSEPAK) 4 MG tablet therapy pack Follow Package Directions     MULTIVITAMIN ORAL [MULTIVITAMIN ORAL] Take 1 capsule by mouth daily.     ondansetron (ZOFRAN) 4 MG tablet [ONDANSETRON (ZOFRAN) 4 MG TABLET] TAKE 1 TABLET BY MOUTH EVERY 8 HOURS AS NEEDED FOR NAUSEA OR VOMITING     topiramate (TOPAMAX) 25 MG tablet Take 1 tablet (25 mg) by mouth 2 times daily     topiramate (TOPAMAX) 50 MG tablet Take 50 mg twice daily     ubrogepant (UBRELVY) 100 MG tablet Take 1 tablet (100 mg) by mouth at onset of headache (bad migraine headache)     ZOLMitriptan (ZOMIG) 5 MG tablet TAKE 1 TABLET BY MOUTH EVERY 2 HOURS AS NEEDED FOR MIGRAINE. MAX OF 2 TABS PER DAY     No current facility-administered medications for this visit.        Allergies:   No Known Allergies     Review of Systems:   As noted above     Physical Exam:   General: Seated comfortably in no acute distress.  Neurologic:     Mental Status: Fully alert, attentive and oriented. Speech clear and fluent, no paraphasic errors.     Cranial Nerves:  Facial movements symmetric. Hearing not formally tested but intact to conversation.  No dysarthria.     Motor: No tremors or other abnormal movements observed.      Sensory:Not able to be tested virtually          Data: Pertinent prior to visit    Imaging:  Previously unrevealing per patient                  The total time of this encounter today amounted to 13 minutes of time on video visit and 21 minutes in total. This time included time spent with the patient, prep work, ordering tests, and performing post visit documentation.      Alisia is a 52 year old who is being evaluated via a billable video visit.      How would you like to obtain your AVS? MyChart  If the video visit is dropped, the invitation should be resent by: Text to cell phone: [phone]  Will anyone else be joining your video visit? No      Video-Visit Details    Type of service:  Video Visit     Originating Location (pt. Location): @ work in at [facility]  Distant Location (provider location):  On-site   Video details: 826 AM -839 AM (13 minutes)  Platform used for Video Visit: MARIA ESTHER Huang, [facility] ([facility])      Again, thank you for allowing me to participate in the care of your patient.        Sincerely,        Chun Crawford MD

## 2023-06-14 NOTE — PATIENT INSTRUCTIONS
Increase topiramate to 50 mg in   mg at night  Continue Ubrelvy PRN  Follow up in 5-6 months sooner with issues questions or changes

## 2023-07-19 ENCOUNTER — OFFICE VISIT (OUTPATIENT)
Dept: FAMILY MEDICINE | Facility: CLINIC | Age: 53
End: 2023-07-19
Payer: COMMERCIAL

## 2023-07-19 ENCOUNTER — LAB (OUTPATIENT)
Dept: LAB | Facility: CLINIC | Age: 53
End: 2023-07-19
Payer: COMMERCIAL

## 2023-07-19 VITALS
SYSTOLIC BLOOD PRESSURE: 110 MMHG | WEIGHT: 125.2 LBS | OXYGEN SATURATION: 99 % | DIASTOLIC BLOOD PRESSURE: 70 MMHG | BODY MASS INDEX: 20.86 KG/M2 | HEART RATE: 67 BPM | RESPIRATION RATE: 17 BRPM | TEMPERATURE: 98.1 F | HEIGHT: 65 IN

## 2023-07-19 DIAGNOSIS — F41.9 ANXIETY: ICD-10-CM

## 2023-07-19 DIAGNOSIS — Z71.3 ENCOUNTER FOR MONITORING OF KETOGENIC DIET: ICD-10-CM

## 2023-07-19 DIAGNOSIS — Z79.890 HORMONE REPLACEMENT THERAPY: ICD-10-CM

## 2023-07-19 DIAGNOSIS — Z13.220 LIPID SCREENING: ICD-10-CM

## 2023-07-19 DIAGNOSIS — F32.A DEPRESSION, UNSPECIFIED DEPRESSION TYPE: ICD-10-CM

## 2023-07-19 DIAGNOSIS — G43.909 MIGRAINE WITHOUT STATUS MIGRAINOSUS, NOT INTRACTABLE, UNSPECIFIED MIGRAINE TYPE: Primary | ICD-10-CM

## 2023-07-19 DIAGNOSIS — Z13.29 SCREENING FOR ENDOCRINE DISORDER: ICD-10-CM

## 2023-07-19 DIAGNOSIS — Z13.6 ENCOUNTER FOR SCREENING FOR CORONARY ARTERY DISEASE: ICD-10-CM

## 2023-07-19 DIAGNOSIS — R11.0 NAUSEA: ICD-10-CM

## 2023-07-19 DIAGNOSIS — G47.00 INSOMNIA, UNSPECIFIED TYPE: ICD-10-CM

## 2023-07-19 LAB
ALBUMIN SERPL-MCNC: 4.6 G/DL (ref 3.5–5)
ALP SERPL-CCNC: 38 U/L (ref 45–120)
ALT SERPL W P-5'-P-CCNC: 14 U/L (ref 0–45)
ANION GAP SERPL CALCULATED.3IONS-SCNC: 8 MMOL/L (ref 5–18)
AST SERPL W P-5'-P-CCNC: 14 U/L (ref 0–40)
BILIRUB SERPL-MCNC: 0.4 MG/DL (ref 0–1)
BUN SERPL-MCNC: 19 MG/DL (ref 8–22)
CALCIUM SERPL-MCNC: 10.1 MG/DL (ref 8.5–10.5)
CHLORIDE BLD-SCNC: 109 MMOL/L (ref 98–107)
CHOLEST SERPL-MCNC: 199 MG/DL
CO2 SERPL-SCNC: 24 MMOL/L (ref 22–31)
CREAT SERPL-MCNC: 0.71 MG/DL (ref 0.6–1.1)
CREAT UR-MCNC: 64.5 MG/DL
DEPRECATED CALCIDIOL+CALCIFEROL SERPL-MC: 52 UG/L (ref 20–75)
ERYTHROCYTE [DISTWIDTH] IN BLOOD BY AUTOMATED COUNT: 13.2 % (ref 10–15)
FASTING STATUS PATIENT QL REPORTED: YES
FOLATE SERPL-MCNC: >40 NG/ML (ref 4.6–34.8)
GFR SERPL CREATININE-BSD FRML MDRD: >90 ML/MIN/1.73M2
GLUCOSE BLD-MCNC: 95 MG/DL (ref 70–125)
HBA1C MFR BLD: 5 %
HCT VFR BLD AUTO: 39.8 % (ref 35–47)
HDLC SERPL-MCNC: 65 MG/DL
HGB BLD-MCNC: 13.2 G/DL (ref 11.7–15.7)
IRON BINDING CAPACITY (ROCHE): 219 UG/DL (ref 240–430)
IRON SATN MFR SERPL: 28 % (ref 15–46)
IRON SERPL-MCNC: 61 UG/DL (ref 37–145)
LDLC SERPL CALC-MCNC: 123 MG/DL
MAGNESIUM SERPL-MCNC: 2 MG/DL (ref 1.8–2.6)
MCH RBC QN AUTO: 32.2 PG (ref 26.5–33)
MCHC RBC AUTO-ENTMCNC: 33.2 G/DL (ref 31.5–36.5)
MCV RBC AUTO: 97 FL (ref 78–100)
MICROALBUMIN UR-MCNC: <12 MG/L
MICROALBUMIN/CREAT UR: NORMAL MG/G{CREAT}
PLATELET # BLD AUTO: 171 10E3/UL (ref 150–450)
POTASSIUM BLD-SCNC: 4.2 MMOL/L (ref 3.5–5)
PROT SERPL-MCNC: 7.5 G/DL (ref 6–8)
RBC # BLD AUTO: 4.1 10E6/UL (ref 3.8–5.2)
SODIUM SERPL-SCNC: 141 MMOL/L (ref 136–145)
TRIGL SERPL-MCNC: 55 MG/DL
TSH SERPL DL<=0.005 MIU/L-ACNC: 2.7 UIU/ML (ref 0.3–5)
WBC # BLD AUTO: 5.2 10E3/UL (ref 4–11)

## 2023-07-19 PROCEDURE — 83550 IRON BINDING TEST: CPT

## 2023-07-19 PROCEDURE — 85027 COMPLETE CBC AUTOMATED: CPT

## 2023-07-19 PROCEDURE — 80053 COMPREHEN METABOLIC PANEL: CPT

## 2023-07-19 PROCEDURE — 84443 ASSAY THYROID STIM HORMONE: CPT

## 2023-07-19 PROCEDURE — 36415 COLL VENOUS BLD VENIPUNCTURE: CPT

## 2023-07-19 PROCEDURE — 82306 VITAMIN D 25 HYDROXY: CPT

## 2023-07-19 PROCEDURE — 83735 ASSAY OF MAGNESIUM: CPT

## 2023-07-19 PROCEDURE — 82746 ASSAY OF FOLIC ACID SERUM: CPT

## 2023-07-19 PROCEDURE — 99214 OFFICE O/P EST MOD 30 MIN: CPT | Performed by: FAMILY MEDICINE

## 2023-07-19 PROCEDURE — 82570 ASSAY OF URINE CREATININE: CPT

## 2023-07-19 PROCEDURE — 84425 ASSAY OF VITAMIN B-1: CPT

## 2023-07-19 PROCEDURE — 80061 LIPID PANEL: CPT

## 2023-07-19 PROCEDURE — 83036 HEMOGLOBIN GLYCOSYLATED A1C: CPT

## 2023-07-19 RX ORDER — HYDROXYZINE HYDROCHLORIDE 25 MG/1
TABLET, FILM COATED ORAL
Qty: 90 TABLET | Refills: 3 | Status: SHIPPED | OUTPATIENT
Start: 2023-07-19 | End: 2024-01-03

## 2023-07-19 RX ORDER — ZOLMITRIPTAN 5 MG/1
TABLET, FILM COATED ORAL
Qty: 36 TABLET | Refills: 3 | Status: SHIPPED | OUTPATIENT
Start: 2023-07-19 | End: 2024-08-05

## 2023-07-19 RX ORDER — ESTRADIOL 0.05 MG/D
PATCH, EXTENDED RELEASE TRANSDERMAL
Qty: 24 PATCH | Refills: 3 | Status: SHIPPED | OUTPATIENT
Start: 2023-07-19 | End: 2024-07-03

## 2023-07-19 RX ORDER — ESCITALOPRAM OXALATE 20 MG/1
30 TABLET ORAL DAILY
Qty: 135 TABLET | Refills: 3 | Status: SHIPPED | OUTPATIENT
Start: 2023-07-19 | End: 2024-08-09

## 2023-07-19 RX ORDER — ONDANSETRON 4 MG/1
TABLET, FILM COATED ORAL
Qty: 30 TABLET | Refills: 11 | Status: SHIPPED | OUTPATIENT
Start: 2023-07-19

## 2023-07-19 ASSESSMENT — PAIN SCALES - GENERAL: PAINLEVEL: NO PAIN (0)

## 2023-07-19 ASSESSMENT — PATIENT HEALTH QUESTIONNAIRE - PHQ9
10. IF YOU CHECKED OFF ANY PROBLEMS, HOW DIFFICULT HAVE THESE PROBLEMS MADE IT FOR YOU TO DO YOUR WORK, TAKE CARE OF THINGS AT HOME, OR GET ALONG WITH OTHER PEOPLE: NOT DIFFICULT AT ALL
SUM OF ALL RESPONSES TO PHQ QUESTIONS 1-9: 0
SUM OF ALL RESPONSES TO PHQ QUESTIONS 1-9: 0

## 2023-07-19 NOTE — PROGRESS NOTES
Assessment & Plan     Migraine without status migrainosus, not intractable, unspecified migraine type  Continue topiramate for migraine prevention and Ubrelvy for migraines.  These are prescribed by her neurologist.  Continue zolmitriptan as needed.  Continue with stress management and self-care.  Continue Zofran as needed for nausea associated with migraines    Encounter for screening for coronary artery disease  She is concerned about the increase in cholesterol since she started keto diet as well as her family history of heart disease.  We discussed that a CT coronary calcium score can be done to help risk stratify and she would like to pursue this.  Orders placed  - CT Coronary Calcium Scan    Hormone replacement therapy  Stable on current regimen.  This will be continued    Encounter for monitoring of ketogenic diet  Labs drawn today.  Some results are still pending    Anxiety  Doing well on current dose of escitalopram.  This will be continued    Insomnia, unspecified type  Continue hydroxyzine    Nausea  Continue Zofran as needed                   Edith Bahena MD  [facility]   Alisia is a 52 year old, presenting for the following health issues:  Recheck Medication        7/19/2023     2:58 PM   Additional Questions   Roomed by Bhumi Alcantara   Accompanied by Self     HPI   Reviewed her health history.      Medications and allergies are reviewed and updated. She has history of migraine headaches.  Frequency of occurrence is variable.   Historically her migraines have been associated with triggers such as increased stress and lack of sleep and hormones.  She was started on estradiol patch a couple of years ago.  Starting the patch made a big difference in her migraine headaches.  She frequently experiences nausea with her migraines, so she uses Zofran to help relieve the nausea.  Since her last office visit last fall, she has been seeing a neurologist for her  "migraines.  She is now on topiramate for migraine prevention.  This is working very well for her.  She is also now using Ubrelvy which has been very helpful as well.  Still will use Zomig if she still has a migraine after the Ubrelvy wears off.     She continues Escitalopram for anxiety and depression. She reports that she is doing well with the medication.  Dose is good and she will like to continue with it.  She uses hydroxyzine  for sleep.  It does cause drowsiness and fatigue. No significant adverse side effects with any of her medications and they remain effective.    She is otherwise feeling well and review of systems is otherwise negative.     She has been doing a keto diet and has lost about 60 pounds.  She is feeling well and healthy.  She did have blood work drawn today for labs.  She is concerned that her LDL and total cholesterol levels have increased by about 30 points and wonders if this could be related to her keto diet.  There is family history of heart disease.  She is not currently experiencing any cardiac symptoms but is concerned about her risk.               Review of Systems         Objective    /70   Pulse 67   Temp 98.1  F (36.7  C) (Temporal)   Resp 17   Ht 1.651 m (5' 5\")   Wt 56.8 kg (125 lb 3.2 oz)   SpO2 99%   BMI 20.83 kg/m    Body mass index is 20.83 kg/m .  Physical Exam   GENERAL: healthy, alert and no distress  RESP: lungs clear to auscultation - no rales, rhonchi or wheezes  CV: regular rate and rhythm, normal S1 S2, no S3 or S4, no murmur, click or rub, no peripheral edema and peripheral pulses strong  PSYCH: mentation appears normal, affect normal/bright                    "

## 2023-07-22 LAB — VIT B1 PYROPHOSHATE BLD-SCNC: 151 NMOL/L

## 2023-08-12 ENCOUNTER — HEALTH MAINTENANCE LETTER (OUTPATIENT)
Age: 53
End: 2023-08-12

## 2023-08-25 ENCOUNTER — HOSPITAL ENCOUNTER (OUTPATIENT)
Dept: CT IMAGING | Facility: CLINIC | Age: 53
Discharge: HOME | End: 2023-08-25
Attending: FAMILY MEDICINE | Admitting: FAMILY MEDICINE
Payer: COMMERCIAL

## 2023-08-25 DIAGNOSIS — Z13.6 ENCOUNTER FOR SCREENING FOR CORONARY ARTERY DISEASE: ICD-10-CM

## 2023-08-25 LAB
CV CALCIUM SCORE AGATSTON LM: 0
CV CALCIUM SCORING AGATSON LAD: 6
CV CALCIUM SCORING AGATSTON CX: 0
CV CALCIUM SCORING AGATSTON RCA: 0
CV CALCIUM SCORING AGATSTON TOTAL: 6

## 2023-08-25 PROCEDURE — 75571 CT HRT W/O DYE W/CA TEST: CPT | Mod: 26 | Performed by: INTERNAL MEDICINE

## 2023-08-25 PROCEDURE — 75571 CT HRT W/O DYE W/CA TEST: CPT

## 2023-09-01 ENCOUNTER — HOSPITAL ENCOUNTER (OUTPATIENT)
Dept: MAMMOGRAPHY | Facility: CLINIC | Age: 53
Discharge: HOME | End: 2023-09-01
Attending: FAMILY MEDICINE | Admitting: FAMILY MEDICINE
Payer: COMMERCIAL

## 2023-09-01 DIAGNOSIS — Z12.31 VISIT FOR SCREENING MAMMOGRAM: ICD-10-CM

## 2023-09-01 PROCEDURE — 77067 SCR MAMMO BI INCL CAD: CPT

## 2023-11-01 DIAGNOSIS — G43.819 OTHER MIGRAINE WITHOUT STATUS MIGRAINOSUS, INTRACTABLE: ICD-10-CM

## 2023-11-27 ENCOUNTER — VIRTUAL VISIT (OUTPATIENT)
Dept: NEUROLOGY | Facility: CLINIC | Age: 53
End: 2023-11-27
Payer: COMMERCIAL

## 2023-11-27 DIAGNOSIS — G43.819 OTHER MIGRAINE WITHOUT STATUS MIGRAINOSUS, INTRACTABLE: Primary | ICD-10-CM

## 2023-11-27 PROCEDURE — 99214 OFFICE O/P EST MOD 30 MIN: CPT | Mod: VID | Performed by: STUDENT IN AN ORGANIZED HEALTH CARE EDUCATION/TRAINING PROGRAM

## 2023-11-27 RX ORDER — TOPIRAMATE 100 MG/1
100 TABLET, FILM COATED ORAL 2 TIMES DAILY
Qty: 180 TABLET | Refills: 1 | Status: SHIPPED | OUTPATIENT
Start: 2023-11-27 | End: 2024-05-14

## 2023-11-27 RX ORDER — RIBOFLAVIN (VITAMIN B2) 400 MG
400 TABLET ORAL DAILY
Qty: 90 TABLET | Refills: 1 | Status: SHIPPED | OUTPATIENT
Start: 2023-11-27 | End: 2024-07-16

## 2023-11-27 RX ORDER — MAGNESIUM OXIDE 400 MG/1
400 TABLET ORAL DAILY
Qty: 90 TABLET | Refills: 1 | Status: SHIPPED | OUTPATIENT
Start: 2023-11-27 | End: 2024-07-16

## 2023-11-27 RX ORDER — NORTRIPTYLINE HCL 10 MG
10 CAPSULE ORAL AT BEDTIME
Qty: 90 CAPSULE | Refills: 1 | Status: SHIPPED | OUTPATIENT
Start: 2023-11-27 | End: 2024-05-14

## 2023-11-27 NOTE — PROGRESS NOTES
Alisia is a 53 year old who is being evaluated via a billable video visit.      How would you like to obtain your AVS? 2Peer (Qlipso)hart  If the video visit is dropped, the invitation should be resent by: Text to cell phone: [phone]  Will anyone else be joining your video visit? No        Video-Visit Details    Type of service:  Video Visit     Originating Location (pt. Location): Work (in [place])    Distant Location (provider location):  On-site  Platform used for Video Visit: [email or web address]  Video data: 8:42 AM-9:02 am

## 2023-11-27 NOTE — LETTER
11/27/2023         RE: Alisia Garcia  [address] [facility] [address]        Dear Colleague,    Thank you for referring your patient, Alisia Garcia, to the [facility]. Please see a copy of my visit note below.    [facility]  Section of General Neurology  Return Patient  Virtual Visit    Alisia Garcia MRN# [id number]   Age: 53 year old YOB: 1970            Assessment and Plan:   Assessment:  Alisia Garcia is a pleasant 53 year old female seen in follow up today for migraine headaches.  They were previously improved in frequency and intensity on topiramate but have since returned and continue to cluster on her at times.   Ubrelvy still helps immensely luckily with less side effect burden than previous triptans.  Discussed future options.  We settled on plan as below.  She will reach out if she would like to trial going higher on anything in between visits or with clustering/headaches that won't go away would use a medrol dose pack.     Plan:  --Increase topiramate to 100 mg BID  --Magnesium oxide 400 mg Riboflavin (vitamin b2) 400 mg daily--To initiate this  --Discussed to give the above a good month, if no improvement can trial nortriptyline 10 mg at bedtime and go higher from there, discussed common side effects, rarer side effects such as excessive serotonin in combination with ssri.   --Continue Ubrelvy 100 mg PRN  --Follow up in ~5 months to reach out sooner with issues questions or changes we can make changes via [email or web address] or see her back sooner if need be.        Sergey Crawford MD   of Neurology   [facility]      Interval history:     Having a spike in headaches/worsening.   Had a 9 day run 11/4-11/13.  Then back 11/20-11/22.   Had been better to an extent previously, but does get cyclical pattern of these headaches.    Is sleeping well but with headaches in AM at times.   Had  hysterctomy at 40, wears an estrogen patch, has ovaries.    Ubrelvy does help still.  Discussed BP medication options. She is wary as runs lower.   Other options---nortriptyline  my vote, potentially CGRP options daily in the future too.    Discussed medrol dose pack in the future.    Had a nice Thanksgiving    A/P at last visit  Alisia Garcia is a pleasant 52 year old female seen in follow up today for migraine headaches.  They are improved in frequency and intensity on topiramate which has improved her ability to function at work, great news.  She is experiencing no side effects on the topamax.  She notes Ubrelvy helps as needed with less side effects than the previous 3 triptans trialed, excellent news.  Will see if slightly higher topamax can help even further.  All questions answered.     Plan:  Increase topiramate to 50 mg in   mg at night, side effects discussed  Continue Ubrelvy PRN  Follow up in 5-6 months sooner with issues questions or change      Past Medical History:     Patient Active Problem List   Diagnosis     Migraine Headache     No past medical history on file.     Past Surgical History:     Past Surgical History:   Procedure Laterality Date     BIOPSY BREAST Right 2008     HYSTERECTOMY  10/2009    Partial     ZZC TOTAL ABDOM HYSTERECTOMY      Description: Total Abdominal Hysterectomy;  Recorded: 2012;        Social History:     Social History     Tobacco Use     Smoking status: Former     Packs/day: 0.00     Years: 0.00     Additional pack years: 0.00     Total pack years: 0.00     Types: Cigarettes     Quit date: 1994     Years since quittin.9     Smokeless tobacco: Never     Tobacco comments:     quit 22 yrs ago   Substance Use Topics     Alcohol use: Yes     Alcohol/week: 1.0 standard drink of alcohol     Comment: Rarely     Drug use: No        Family History:     Family History   Problem Relation Age of Onset     Diabetes Father      Breast Cancer Maternal Aunt 38.00      Cancer Mother         lung cancer     Other Cancer Mother         My mom was dx with metastatic small erica lung cancer 2019 &  6 weeks later 3/15/19        Medications:     Current Outpatient Medications   Medication Sig     escitalopram (LEXAPRO) 20 MG tablet Take 1.5 tablets (30 mg) by mouth daily     estradiol (VIVELLE-DOT) 0.05 MG/24HR bi-weekly patch APPLY 1 PATCH EXTERNALLY TO THE SKIN 2 TIMES A WEEK     hydrOXYzine (ATARAX) 25 MG tablet TAKE ONE TABLET BY MOUTH EVERY 6 HOURS AS NEEDED FOR ANXIETY OR SLEEP     MULTIVITAMIN ORAL [MULTIVITAMIN ORAL] Take 1 capsule by mouth daily.     ondansetron (ZOFRAN) 4 MG tablet [ONDANSETRON (ZOFRAN) 4 MG TABLET] TAKE 1 TABLET BY MOUTH EVERY 8 HOURS AS NEEDED FOR NAUSEA OR VOMITING     topiramate (TOPAMAX) 50 MG tablet Take 50 mg in AM and 100 mg PM     ubrogepant (UBRELVY) 100 MG tablet Take 1 tablet (100 mg) by mouth at onset of headache (bad migraine headache)     ZOLMitriptan (ZOMIG) 5 MG tablet TAKE 1 TABLET BY MOUTH EVERY 2 HOURS AS NEEDED FOR MIGRAINE. MAX OF 2 TABS PER DAY     No current facility-administered medications for this visit.        Allergies:   No Known Allergies     Review of Systems:   As noted above     Physical Exam:   General: Seated comfortably in no acute distress.  Neurologic:     Mental Status: Fully alert, attentive and oriented. Speech clear and fluent, no paraphasic errors.     Cranial Nerves: Facial movements symmetric. Hearing not formally tested but intact to conversation.  No dysarthria.     Motor: No tremors or other abnormal movements observed.      Sensory:Not able to be tested virtually         Data: Pertinent prior to visit   Imaging:  Previously WNL per previous discussion                  The total time of this encounter today amounted to 35minutes in total. This time included time spent with the patient on video, prep work, ordering tests, and performing post visit documentation.      Alisia is a 53 year old who is  being evaluated via a billable video visit.      How would you like to obtain your AVS? MyChart  If the video visit is dropped, the invitation should be resent by: Text to cell phone: [phone]  Will anyone else be joining your video visit? No        Video-Visit Details    Type of service:  Video Visit     Originating Location (pt. Location): Work (in [place])    Distant Location (provider location):  On-site  Platform used for Video Visit: [employer]  Video data: 8:42 AM-9:02 am        Again, thank you for allowing me to participate in the care of your patient.        Sincerely,        Chun Crawford MD

## 2023-11-27 NOTE — PATIENT INSTRUCTIONS
Magnesium oxide 400 mg Riboflavin (vitamin b2) 400 mg daily  Increase topiramate to 100 mg BID give this a good month  If no increase:   --nortriptyline:25 mg at bedtime discussed in the future   Discussed common side effects--fatigue, dry mouth, and less common side effects: constipation, arrhythmia among other side effects    Follow up in ~5 months

## 2023-11-27 NOTE — PROGRESS NOTES
[facility]  Section of General Neurology  Return Patient  Virtual Visit    Alisia Garcia MRN# [id number]   Age: 53 year old YOB: 1970            Assessment and Plan:   Assessment:  Alisia Garcia is a pleasant 53 year old female seen in follow up today for migraine headaches.  They were previously improved in frequency and intensity on topiramate but have since returned and continue to cluster on her at times.   Ubrelvy still helps immensely luckily with less side effect burden than previous triptans.  Discussed future options.  We settled on plan as below.  She will reach out if she would like to trial going higher on anything in between visits or with clustering/headaches that won't go away would use a medrol dose pack.     Plan:  --Increase topiramate to 100 mg BID  --Magnesium oxide 400 mg Riboflavin (vitamin b2) 400 mg daily--To initiate this  --Discussed to give the above a good month, if no improvement can trial nortriptyline 10 mg at bedtime and go higher from there, discussed common side effects, rarer side effects such as excessive serotonin in combination with ssri.   --Continue Ubrelvy 100 mg PRN  --Follow up in ~5 months to reach out sooner with issues questions or changes we can make changes via Audiosockethart or see her back sooner if need be.        Sergey Crawford MD   of Neurology   [facility]      Interval history:     Having a spike in headaches/worsening.   Had a 9 day run 11/4-11/13.  Then back 11/20-11/22.   Had been better to an extent previously, but does get cyclical pattern of these headaches.    Is sleeping well but with headaches in AM at times.   Had hysterctomy at 40, wears an estrogen patch, has ovaries.    Ubrelvy does help still.  Discussed BP medication options. She is wary as runs lower.   Other options---nortriptyline  my vote, potentially CGRP options daily in the future too.    Discussed medrol dose  pack in the future.    Had a nice Thanksgiving    A/P at last visit  Alisia Garcia is a pleasant 52 year old female seen in follow up today for migraine headaches.  They are improved in frequency and intensity on topiramate which has improved her ability to function at work, great news.  She is experiencing no side effects on the topamax.  She notes Ubrelvy helps as needed with less side effects than the previous 3 triptans trialed, excellent news.  Will see if slightly higher topamax can help even further.  All questions answered.     Plan:  Increase topiramate to 50 mg in   mg at night, side effects discussed  Continue Ubrelvy PRN  Follow up in 5-6 months sooner with issues questions or change      Past Medical History:     Patient Active Problem List   Diagnosis    Migraine Headache     No past medical history on file.     Past Surgical History:     Past Surgical History:   Procedure Laterality Date    BIOPSY BREAST Right 2008    HYSTERECTOMY  10/2009    Partial    ZZC TOTAL ABDOM HYSTERECTOMY      Description: Total Abdominal Hysterectomy;  Recorded: 2012;        Social History:     Social History     Tobacco Use    Smoking status: Former     Packs/day: 0.00     Years: 0.00     Additional pack years: 0.00     Total pack years: 0.00     Types: Cigarettes     Quit date: 1994     Years since quittin.9    Smokeless tobacco: Never    Tobacco comments:     quit 22 yrs ago   Substance Use Topics    Alcohol use: Yes     Alcohol/week: 1.0 standard drink of alcohol     Comment: Rarely    Drug use: No        Family History:     Family History   Problem Relation Age of Onset    Diabetes Father     Breast Cancer Maternal Aunt 38.00    Cancer Mother         lung cancer    Other Cancer Mother         My mom was dx with metastatic small erica lung cancer 2019 &  6 weeks later 3/15/19        Medications:     Current Outpatient Medications   Medication Sig    escitalopram (LEXAPRO) 20 MG tablet Take  1.5 tablets (30 mg) by mouth daily    estradiol (VIVELLE-DOT) 0.05 MG/24HR bi-weekly patch APPLY 1 PATCH EXTERNALLY TO THE SKIN 2 TIMES A WEEK    hydrOXYzine (ATARAX) 25 MG tablet TAKE ONE TABLET BY MOUTH EVERY 6 HOURS AS NEEDED FOR ANXIETY OR SLEEP    MULTIVITAMIN ORAL [MULTIVITAMIN ORAL] Take 1 capsule by mouth daily.    ondansetron (ZOFRAN) 4 MG tablet [ONDANSETRON (ZOFRAN) 4 MG TABLET] TAKE 1 TABLET BY MOUTH EVERY 8 HOURS AS NEEDED FOR NAUSEA OR VOMITING    topiramate (TOPAMAX) 50 MG tablet Take 50 mg in AM and 100 mg PM    ubrogepant (UBRELVY) 100 MG tablet Take 1 tablet (100 mg) by mouth at onset of headache (bad migraine headache)    ZOLMitriptan (ZOMIG) 5 MG tablet TAKE 1 TABLET BY MOUTH EVERY 2 HOURS AS NEEDED FOR MIGRAINE. MAX OF 2 TABS PER DAY     No current facility-administered medications for this visit.        Allergies:   No Known Allergies     Review of Systems:   As noted above     Physical Exam:   General: Seated comfortably in no acute distress.  Neurologic:     Mental Status: Fully alert, attentive and oriented. Speech clear and fluent, no paraphasic errors.     Cranial Nerves: Facial movements symmetric. Hearing not formally tested but intact to conversation.  No dysarthria.     Motor: No tremors or other abnormal movements observed.      Sensory:Not able to be tested virtually         Data: Pertinent prior to visit   Imaging:  Previously WNL per previous discussion                  The total time of this encounter today amounted to 35minutes in total. This time included time spent with the patient on video, prep work, ordering tests, and performing post visit documentation.

## 2024-01-03 DIAGNOSIS — G47.00 INSOMNIA, UNSPECIFIED TYPE: ICD-10-CM

## 2024-01-03 RX ORDER — HYDROXYZINE HYDROCHLORIDE 25 MG/1
TABLET, FILM COATED ORAL
Qty: 90 TABLET | Refills: 2 | Status: SHIPPED | OUTPATIENT
Start: 2024-01-03 | End: 2024-03-01

## 2024-02-14 DIAGNOSIS — G43.819 OTHER MIGRAINE WITHOUT STATUS MIGRAINOSUS, INTRACTABLE: ICD-10-CM

## 2024-03-01 DIAGNOSIS — G47.00 INSOMNIA, UNSPECIFIED TYPE: ICD-10-CM

## 2024-03-01 RX ORDER — HYDROXYZINE HYDROCHLORIDE 25 MG/1
TABLET, FILM COATED ORAL
Qty: 90 TABLET | Refills: 1 | Status: SHIPPED | OUTPATIENT
Start: 2024-03-01 | End: 2024-04-11

## 2024-04-11 DIAGNOSIS — G47.00 INSOMNIA, UNSPECIFIED TYPE: ICD-10-CM

## 2024-04-11 RX ORDER — HYDROXYZINE HYDROCHLORIDE 25 MG/1
TABLET, FILM COATED ORAL
Qty: 90 TABLET | Refills: 1 | Status: SHIPPED | OUTPATIENT
Start: 2024-04-11 | End: 2024-05-22

## 2024-04-11 NOTE — TELEPHONE ENCOUNTER
Patient never been seen at [facility]. Routing to [facility] where patient was seen most recently.     Tessie Mello CMA.

## 2024-05-14 DIAGNOSIS — G43.819 OTHER MIGRAINE WITHOUT STATUS MIGRAINOSUS, INTRACTABLE: ICD-10-CM

## 2024-05-14 RX ORDER — NORTRIPTYLINE HCL 10 MG
10 CAPSULE ORAL AT BEDTIME
Qty: 90 CAPSULE | Refills: 1 | Status: SHIPPED | OUTPATIENT
Start: 2024-05-14 | End: 2024-08-09

## 2024-05-14 RX ORDER — TOPIRAMATE 100 MG/1
100 TABLET, FILM COATED ORAL 2 TIMES DAILY
Qty: 180 TABLET | Refills: 1 | Status: SHIPPED | OUTPATIENT
Start: 2024-05-14 | End: 2024-07-16

## 2024-05-22 DIAGNOSIS — G47.00 INSOMNIA, UNSPECIFIED TYPE: ICD-10-CM

## 2024-05-22 RX ORDER — HYDROXYZINE HYDROCHLORIDE 25 MG/1
TABLET, FILM COATED ORAL
Qty: 90 TABLET | Refills: 1 | OUTPATIENT
Start: 2024-05-22

## 2024-05-22 NOTE — TELEPHONE ENCOUNTER
Ronda Anaya was the staff person sending the message on to Dr. Jamie joiner for refill.  Maryellen Sanchez on 5/22/2024 at 6:23 PM

## 2024-05-22 NOTE — TELEPHONE ENCOUNTER
Spoke with [facility] and the last prescription they have on file is from 02/2024 and the refill was used. Patient is in need of refill for the Hydroxyzine 25 mg.   Message to Dr. Ayala to review and send new Prescription.  Thank you,  Maryellen Sanchez on 5/22/2024 at 6:25 PM

## 2024-05-24 RX ORDER — HYDROXYZINE HYDROCHLORIDE 25 MG/1
TABLET, FILM COATED ORAL
Qty: 90 TABLET | Refills: 1 | Status: SHIPPED | OUTPATIENT
Start: 2024-05-24 | End: 2024-06-17

## 2024-05-30 ASSESSMENT — PATIENT HEALTH QUESTIONNAIRE - PHQ9
SUM OF ALL RESPONSES TO PHQ QUESTIONS 1-9: 0
SUM OF ALL RESPONSES TO PHQ QUESTIONS 1-9: 0
10. IF YOU CHECKED OFF ANY PROBLEMS, HOW DIFFICULT HAVE THESE PROBLEMS MADE IT FOR YOU TO DO YOUR WORK, TAKE CARE OF THINGS AT HOME, OR GET ALONG WITH OTHER PEOPLE: NOT DIFFICULT AT ALL

## 2024-05-31 ENCOUNTER — OFFICE VISIT (OUTPATIENT)
Dept: FAMILY MEDICINE | Facility: CLINIC | Age: 54
End: 2024-05-31
Payer: COMMERCIAL

## 2024-05-31 VITALS
HEIGHT: 66 IN | OXYGEN SATURATION: 98 % | HEART RATE: 64 BPM | BODY MASS INDEX: 20.09 KG/M2 | WEIGHT: 125 LBS | DIASTOLIC BLOOD PRESSURE: 70 MMHG | SYSTOLIC BLOOD PRESSURE: 130 MMHG | RESPIRATION RATE: 20 BRPM | TEMPERATURE: 97.4 F

## 2024-05-31 DIAGNOSIS — Z01.818 PREOP GENERAL PHYSICAL EXAM: Primary | ICD-10-CM

## 2024-05-31 DIAGNOSIS — S83.512A RUPTURE OF ANTERIOR CRUCIATE LIGAMENT OF LEFT KNEE, INITIAL ENCOUNTER: ICD-10-CM

## 2024-05-31 PROBLEM — S83.519A ACL TEAR: Status: ACTIVE | Noted: 2024-03-23

## 2024-05-31 LAB — HGB BLD-MCNC: 13.7 G/DL (ref 11.7–15.7)

## 2024-05-31 PROCEDURE — 99214 OFFICE O/P EST MOD 30 MIN: CPT | Performed by: STUDENT IN AN ORGANIZED HEALTH CARE EDUCATION/TRAINING PROGRAM

## 2024-05-31 PROCEDURE — 85018 HEMOGLOBIN: CPT | Performed by: STUDENT IN AN ORGANIZED HEALTH CARE EDUCATION/TRAINING PROGRAM

## 2024-05-31 PROCEDURE — 36416 COLLJ CAPILLARY BLOOD SPEC: CPT | Performed by: STUDENT IN AN ORGANIZED HEALTH CARE EDUCATION/TRAINING PROGRAM

## 2024-05-31 ASSESSMENT — PAIN SCALES - GENERAL: PAINLEVEL: NO PAIN (0)

## 2024-05-31 NOTE — PROGRESS NOTES
[facility]  [address]  [facility] [address]  Phone: [phone]  Fax: [phone]  Primary Provider: Maegan Cota  Pre-op Performing Provider: ALLI PEPPER        5/26/2024   Surgical Information   What procedure is being done? ACL Repair   [facility] or [facility] where [facility]   Who is doing the procedure / surgery? Dr. Strong   Date of surgery / procedure: 6/3/24   Time of surgery / procedure: Am   Where do you plan to recover after surgery? at home with family     Assessment & Plan   53-year-old female with past medical history of migraines who presents for preop evaluation to have left knee ACL repair.  Patient is low risk and surgery is relatively low risk.  I recommended a hemoglobin as she has not had 1 in approximately a year.  Would require a urine pregnancy but patient has had a hysterectomy.  She is approved to proceed with surgery as long as hemoglobin is not a critical value.    1. Preop general physical exam  - Hemoglobin; Future  - Hemoglobin    2. Rupture of anterior cruciate ligament of left knee, initial encounter    The proposed surgical procedure is considered INTERMEDIATE risk.    Antiplatelet or Anticoagulation Medication Instructions   - Patient is on no antiplatelet or anticoagulation medications.    Additional Medication Instructions  Hold daily medications day of    RECOMMENDATION:  Approval given to proceed with proposed procedure, without further diagnostic evaluation.    Subjective     HPI related to upcoming procedure: Left ACL and high grade MCL injury on 3/23/24 [place]           5/26/2024     9:46 AM   Preop Questions   Have you ever had a heart attack or stroke? No   Have you ever had surgery on your heart or blood vessels, such as a stent placement, a coronary artery bypass, or surgery on an artery in your head, neck, heart, or legs? No   Do you have chest pain with  activity? No   Do you have a history of heart failure? No   Do you currently have a cold, bronchitis or symptoms of other infection? No   Do you have a cough, shortness of breath, or wheezing? No   Do you or anyone in your family have previous history of blood clots? No   Do you or does anyone in your family have a serious bleeding problem such as prolonged bleeding following surgeries or cuts? No   Have you ever had problems with anemia or been told to take iron pills? No   Have you had any abnormal blood loss such as black, tarry or bloody stools, or abnormal vaginal bleeding? No   Have you ever had a blood transfusion? No   Are you willing to have a blood transfusion if it is medically needed before, during, or after your surgery? Yes   Have you or any of your relatives ever had problems with anesthesia? No   Do you have sleep apnea, excessive snoring or daytime drowsiness? No   Do you have any artifical heart valves or other implanted medical devices like a pacemaker, defibrillator, or continuous glucose monitor? No   Do you have artificial joints? No   Are you allergic to latex? No       METS >4? YES    Greater than 65? No    Health Care Directive:  Patient does not have a Health Care Directive or Living Will: Full COde    Preoperative Review of :   reviewed - no record of controlled substances prescribed.      Review of Systems  Complete ROS is negative except as noted in HPI    Patient Active Problem List    Diagnosis Date Noted    Migraine Headache      Priority: Medium     Created by Conversion  Replacement Utility updated for latest IMO load             Past Surgical History:   Procedure Laterality Date    BIOPSY BREAST Right 2008    HYSTERECTOMY  10/2009    Partial    ZZC TOTAL ABDOM HYSTERECTOMY      Description: Total Abdominal Hysterectomy;  Recorded: 02/28/2012;       Current Outpatient Medications   Medication Sig Dispense Refill    escitalopram (LEXAPRO) 20 MG tablet Take 1.5 tablets (30 mg)  by mouth daily 135 tablet 3    estradiol (VIVELLE-DOT) 0.05 MG/24HR bi-weekly patch APPLY 1 PATCH EXTERNALLY TO THE SKIN 2 TIMES A WEEK 24 patch 3    hydrOXYzine HCl (ATARAX) 25 MG tablet TAKE 1 TABLET BY MOUTH EVERY 6 HOURS AS NEEDED FOR ANXIETY OR SLEEP 90 tablet 1    magnesium oxide (MAG-OX) 400 MG tablet Take 1 tablet (400 mg) by mouth daily 90 tablet 1    MULTIVITAMIN ORAL [MULTIVITAMIN ORAL] Take 1 capsule by mouth daily.      nortriptyline (PAMELOR) 10 MG capsule Take 1 capsule (10 mg) by mouth at bedtime 90 capsule 1    ondansetron (ZOFRAN) 4 MG tablet [ONDANSETRON (ZOFRAN) 4 MG TABLET] TAKE 1 TABLET BY MOUTH EVERY 8 HOURS AS NEEDED FOR NAUSEA OR VOMITING 30 tablet 11    Riboflavin 400 MG TABS Take 400 mg by mouth daily 90 tablet 1    topiramate (TOPAMAX) 100 MG tablet Take 1 tablet (100 mg) by mouth 2 times daily Take 50 mg in AM and 100 mg  tablet 1    ubrogepant (UBRELVY) 100 MG tablet Take 1 tablet (100 mg) by mouth at onset of headache (bad migraine headache) 9 tablet 5    ZOLMitriptan (ZOMIG) 5 MG tablet TAKE 1 TABLET BY MOUTH EVERY 2 HOURS AS NEEDED FOR MIGRAINE. MAX OF 2 TABS PER DAY 36 tablet 3     No current facility-administered medications for this visit.        No Known Allergies     Social History     Socioeconomic History    Marital status:      Spouse name: Not on file    Number of children: Not on file    Years of education: Not on file    Highest education level: Not on file   Occupational History    Not on file   Tobacco Use    Smoking status: Former     Current packs/day: 0.00     Types: Cigarettes     Quit date: 1994     Years since quittin.4    Smokeless tobacco: Never    Tobacco comments:     quit 22 yrs ago   Substance and Sexual Activity    Alcohol use: Yes     Alcohol/week: 1.0 standard drink of alcohol     Comment: Rarely    Drug use: No    Sexual activity: Yes     Partners: Male     Birth control/protection: Female Surgical     Comment: hyst   Other Topics  "Concern    Parent/sibling w/ CABG, MI or angioplasty before 65F 55M? No   Social History Narrative    Not on file     Social Determinants of Health     Financial Resource Strain: Not on file   Food Insecurity: Not on file   Transportation Needs: Not on file   Physical Activity: Not on file   Stress: Not on file   Social Connections: Not on file   Interpersonal Safety: Low Risk  (2024)    Interpersonal Safety     Do you feel physically and emotionally safe where you currently live?: Yes     Within the past 12 months, have you been hit, slapped, kicked or otherwise physically hurt by someone?: No     Within the past 12 months, have you been humiliated or emotionally abused in other ways by your partner or ex-partner?: No   Housing Stability: Not on file       Family History   Problem Relation Age of Onset    Diabetes Father     Breast Cancer Maternal Aunt 38.00    Cancer Mother         lung cancer    Other Cancer Mother         My mom was dx with metastatic small erica lung cancer 2019 &  6 weeks later 3/15/19         Objective   /70   Pulse 64   Temp 97.4  F (36.3  C)   Resp 20   Ht 1.676 m (5' 6\")   Wt 56.7 kg (125 lb)   SpO2 98%   BMI 20.18 kg/m      General appearance: Alert, cooperative, no distress, appears stated age  Head: Normocephalic, atraumatic, without obvious abnormality  Eyes: Pupils equal round, reactive.  Conjunctiva clear.  Ears:  No cerumen, TM's grey dull with structures seen bilaterally  Nose: Nares normal, no drainage.  Throat: Lips, mucosa, tongue normal mucosa pink and moist  Neck: Supple, symmetric, trachea midline  Lungs: Clear to auscultation bilaterally, no wheezing or crackles present.  Respirations unlabored  Heart: Regular rate and rhythm, normal S1 and S2, no murmur, rub or gallop.  Extremities: Extremities normal, atraumatic.  No cyanosis or edema.  Skin: Skin color, texture, turgor normal no rashes or lesions on limited skin exam    Diagnostics:  No labs were " ordered during this visit.   No EKG required, no history of coronary heart disease, significant arrhythmia, peripheral arterial disease or other structural heart disease.    Revised Cardiac Risk Index (RCRI):  The patient has the following serious cardiovascular risks for perioperative complications:   - No serious cardiac risks = 0 points     RCRI Interpretation: 0 points: Class I (very low risk - 0.4% complication rate)     Signed Electronically by: Hi Brower MD  Copy of this evaluation report is provided to requesting physician.}

## 2024-05-31 NOTE — RESULT ENCOUNTER NOTE
Alisia Garcia  Your results from your recent clinic visit show:  Your Hemoglobin was normal    If you have more questions please call the clinic at [phone] or send me a [employer] message    Dr. Hi Alvarez

## 2024-06-16 ENCOUNTER — MYC MEDICAL ADVICE (OUTPATIENT)
Dept: FAMILY MEDICINE | Facility: CLINIC | Age: 54
End: 2024-06-16
Payer: COMMERCIAL

## 2024-06-16 DIAGNOSIS — G47.00 INSOMNIA, UNSPECIFIED TYPE: ICD-10-CM

## 2024-06-17 RX ORDER — HYDROXYZINE HYDROCHLORIDE 25 MG/1
TABLET, FILM COATED ORAL
Qty: 90 TABLET | Refills: 1 | Status: SHIPPED | OUTPATIENT
Start: 2024-06-17 | End: 2024-07-24

## 2024-07-03 DIAGNOSIS — Z79.890 HORMONE REPLACEMENT THERAPY: ICD-10-CM

## 2024-07-03 RX ORDER — ESTRADIOL 0.05 MG/D
PATCH, EXTENDED RELEASE TRANSDERMAL
Qty: 24 PATCH | Refills: 0 | Status: SHIPPED | OUTPATIENT
Start: 2024-07-03 | End: 2024-08-09

## 2024-07-03 NOTE — TELEPHONE ENCOUNTER
Pt recently had a visit with Dr. Brower, pt reported that he said he could be her primary. Confirming this is accurate? Does she need an estab care appt?

## 2024-07-16 ENCOUNTER — VIRTUAL VISIT (OUTPATIENT)
Dept: NEUROLOGY | Facility: CLINIC | Age: 54
End: 2024-07-16
Payer: COMMERCIAL

## 2024-07-16 DIAGNOSIS — G43.819 OTHER MIGRAINE WITHOUT STATUS MIGRAINOSUS, INTRACTABLE: ICD-10-CM

## 2024-07-16 PROCEDURE — G2211 COMPLEX E/M VISIT ADD ON: HCPCS | Mod: 95 | Performed by: STUDENT IN AN ORGANIZED HEALTH CARE EDUCATION/TRAINING PROGRAM

## 2024-07-16 PROCEDURE — 99214 OFFICE O/P EST MOD 30 MIN: CPT | Mod: 95 | Performed by: STUDENT IN AN ORGANIZED HEALTH CARE EDUCATION/TRAINING PROGRAM

## 2024-07-16 RX ORDER — RIBOFLAVIN (VITAMIN B2) 400 MG
400 TABLET ORAL DAILY
Qty: 90 TABLET | Refills: 1 | Status: SHIPPED | OUTPATIENT
Start: 2024-07-16

## 2024-07-16 RX ORDER — MAGNESIUM OXIDE 400 MG/1
400 TABLET ORAL DAILY
Qty: 90 TABLET | Refills: 1 | Status: SHIPPED | OUTPATIENT
Start: 2024-07-16

## 2024-07-16 RX ORDER — TOPIRAMATE 100 MG/1
100 TABLET, FILM COATED ORAL 2 TIMES DAILY
Qty: 180 TABLET | Refills: 1 | Status: SHIPPED | OUTPATIENT
Start: 2024-07-16

## 2024-07-16 NOTE — LETTER
7/16/2024      Alisia Garcia  [address] [facility] [address]      Dear Colleague,    Thank you for referring your patient, Alisia Garcia, to the [facility]. Please see a copy of my visit note below.    [facility]/[place]  Section of General Neurology  Return Patient  Virtual Visit    Alisia Garcia MRN# [id number]   Age: 53 year old YOB: 1970          Assessment and Plan:   Assessment:  Alisia Garcia is a pleasant 53 year old female seen in follow up today for migraine headaches. They remain a bit up and down, worse for a few months then later improved potentially related to stress, elevation and/or travel.  We discussed future options including nortriptyline which was previously broached vs increasing topamax further.  We mutually think she is doing well enough to not luda with our regimen at this time but she will reach out should she want to increase topamax e.g. further in between visits.        Plan:  Continue:  Topiramate 100 mg BID  Magnesium oxide 400 mg Riboflavin (vitamin b2) 400 mg daily  Ubrelvy 100 mg PRN  OK to use OTC as need options on same day as Ubrelvy if needed, no more than 3-4 days out of the week ideally    Future options  Higher on topamax (to uptitrate to 100/150 then 150/150 if need be)  Addition of nortriptyline as previously speculated    To follow up in 6 months, sooner with any issues changes or questions     Sergey Crawford MD   of Neurology   [facility]      Interval history:   In [place] at [place]  Had a rough couple of months in March/April, a lot of stress  Elevation was hard on her   Still getting them but improved  At least 2-3x a month x 2-3 days in length.  4-9 days total.    Ubrelvy still helps quite a bit.  Works quicker but can wear off.   Magnesium helps her sleep better--liking this  Riboflavin helping potentially too.    Discussed other options,  risk/benefit    A/p at last visit  Alisia Garcia is a pleasant 53 year old female seen in follow up today for migraine headaches.  They were previously improved in frequency and intensity on topiramate but have since returned and continue to cluster on her at times.   Ubrelvy still helps immensely luckily with less side effect burden than previous triptans.  Discussed future options.  We settled on plan as below.  She will reach out if she would like to trial going higher on anything in between visits or with clustering/headaches that won't go away would use a medrol dose pack.      Plan:  --Increase topiramate to 100 mg BID  --Magnesium oxide 400 mg Riboflavin (vitamin b2) 400 mg daily--To initiate this  --Discussed to give the above a good month, if no improvement can trial nortriptyline 10 mg at bedtime and go higher from there, discussed common side effects, rarer side effects such as excessive serotonin in combination with ssri.   --Continue Ubrelvy 100 mg PRN  --Follow up in ~5 months to reach out sooner with issues questions or changes we can make changes via [employer] or see her back sooner if need be.    Past Medical History:     Patient Active Problem List   Diagnosis     Migraine Headache     ACL tear     No past medical history on file.     Past Surgical History:     Past Surgical History:   Procedure Laterality Date     BIOPSY BREAST Right 2008     HYSTERECTOMY  10/2009    Partial     ZZC TOTAL ABDOM HYSTERECTOMY      Description: Total Abdominal Hysterectomy;  Recorded: 2012;        Social History:     Social History     Tobacco Use     Smoking status: Former     Current packs/day: 0.00     Types: Cigarettes     Quit date: 1994     Years since quittin.5     Smokeless tobacco: Never     Tobacco comments:     quit 22 yrs ago   Substance Use Topics     Alcohol use: Yes     Alcohol/week: 1.0 standard drink of alcohol     Comment: Rarely     Drug use: No        Family History:     Family  History   Problem Relation Age of Onset     Diabetes Father      Breast Cancer Maternal Aunt 38.00     Cancer Mother         lung cancer     Other Cancer Mother         My mom was dx with metastatic small erica lung cancer 2019 &  6 weeks later 3/15/19        Medications:     Current Outpatient Medications   Medication Sig Dispense Refill     escitalopram (LEXAPRO) 20 MG tablet Take 1.5 tablets (30 mg) by mouth daily 135 tablet 3     estradiol (VIVELLE-DOT) 0.05 MG/24HR bi-weekly patch APPLY 1 PATCH EXTERNALLY TO THE SKIN 2 TIMES A WEEK 24 patch 0     hydrOXYzine HCl (ATARAX) 25 MG tablet TAKE 1 TABLET BY MOUTH EVERY 6 HOURS AS NEEDED FOR ANXIETY OR SLEEP 90 tablet 1     magnesium oxide (MAG-OX) 400 MG tablet Take 1 tablet (400 mg) by mouth daily 90 tablet 1     MULTIVITAMIN ORAL [MULTIVITAMIN ORAL] Take 1 capsule by mouth daily.       nortriptyline (PAMELOR) 10 MG capsule Take 1 capsule (10 mg) by mouth at bedtime 90 capsule 1     ondansetron (ZOFRAN) 4 MG tablet [ONDANSETRON (ZOFRAN) 4 MG TABLET] TAKE 1 TABLET BY MOUTH EVERY 8 HOURS AS NEEDED FOR NAUSEA OR VOMITING 30 tablet 11     Riboflavin 400 MG TABS Take 400 mg by mouth daily 90 tablet 1     topiramate (TOPAMAX) 100 MG tablet Take 1 tablet (100 mg) by mouth 2 times daily Take 50 mg in AM and 100 mg  tablet 1     ubrogepant (UBRELVY) 100 MG tablet Take 1 tablet (100 mg) by mouth at onset of headache (bad migraine headache) 9 tablet 5     ZOLMitriptan (ZOMIG) 5 MG tablet TAKE 1 TABLET BY MOUTH EVERY 2 HOURS AS NEEDED FOR MIGRAINE. MAX OF 2 TABS PER DAY 36 tablet 3     No current facility-administered medications for this visit.        Allergies:   No Known Allergies     Review of Systems:   As noted above     Physical Exam:   General: Seated comfortably in no acute distress.  Neurologic:     Mental Status: Fully alert, attentive and oriented. Speech clear and fluent, no paraphasic errors.     Cranial Nerves: Facial movements symmetric. Hearing not  formally tested but intact to conversation.  No dysarthria.     Motor: No tremors or other abnormal movements observed.      Sensory:Not able to be tested virtually              Data: Pertinent prior to visit   Imaging Previously WNL         The total time of this encounter today amounted to 30 minutes in total. This time included time spent with the patient, prep work, reviewing previous data ordering medications, and performing post visit documentation.    The longitudinal plan of care for migraine headaches was addressed during this visit. Due to the added complexity in care, I will continue to support Ms. Garcia in the subsequent management of this condition(s) and with the ongoing continuity of care of this condition(s).      Alisia is a 53 year old who is being evaluated via a billable video visit.    How would you like to obtain your AVS? MyChart  If the video visit is dropped, the invitation should be resent by: Text to cell phone: [phone]  Will anyone else be joining your video visit? No    Video-Visit Details    Type of service:  Video Visit   Originating Location (pt. Location): At work, in [place]    Distant Location (provider location):  On-site  Platform used for Video Visit: [employer] data 317-329 PM      Again, thank you for allowing me to participate in the care of your patient.        Sincerely,        Chun Crawford MD

## 2024-07-16 NOTE — PROGRESS NOTES
[facility]/[place]  Section of General Neurology  Return Patient  Virtual Visit    Alisia Garcia MRN# [id number]   Age: 53 year old YOB: 1970          Assessment and Plan:   Assessment:  Alisia Garcia is a pleasant 53 year old female seen in follow up today for migraine headaches. They remain a bit up and down, worse for a few months then later improved potentially related to stress, elevation and/or travel.  We discussed future options including nortriptyline which was previously broached vs increasing topamax further.  We mutually think she is doing well enough to not luda with our regimen at this time but she will reach out should she want to increase topamax e.g. further in between visits.        Plan:  Continue:  Topiramate 100 mg BID  Magnesium oxide 400 mg Riboflavin (vitamin b2) 400 mg daily  Ubrelvy 100 mg PRN  OK to use OTC as need options on same day as Ubrelvy if needed, no more than 3-4 days out of the week ideally    Future options  Higher on topamax (to uptitrate to 100/150 then 150/150 if need be)  Addition of nortriptyline as previously speculated    To follow up in 6 months, sooner with any issues changes or questions     Sergey Crawford MD   of Neurology   [facility]      Interval history:   In [place] at [place]  Had a rough couple of months in March/April, a lot of stress  Elevation was hard on her   Still getting them but improved  At least 2-3x a month x 2-3 days in length.  4-9 days total.    Ubrelvy still helps quite a bit.  Works quicker but can wear off.   Magnesium helps her sleep better--liking this  Riboflavin helping potentially too.    Discussed other options, risk/benefit    A/p at last visit  Alisia Garcia is a pleasant 53 year old female seen in follow up today for migraine headaches.  They were previously improved in frequency and intensity on topiramate but have since returned and continue to cluster on  her at times.   Ubrelvy still helps immensely luckily with less side effect burden than previous triptans.  Discussed future options.  We settled on plan as below.  She will reach out if she would like to trial going higher on anything in between visits or with clustering/headaches that won't go away would use a medrol dose pack.      Plan:  --Increase topiramate to 100 mg BID  --Magnesium oxide 400 mg Riboflavin (vitamin b2) 400 mg daily--To initiate this  --Discussed to give the above a good month, if no improvement can trial nortriptyline 10 mg at bedtime and go higher from there, discussed common side effects, rarer side effects such as excessive serotonin in combination with ssri.   --Continue Ubrelvy 100 mg PRN  --Follow up in ~5 months to reach out sooner with issues questions or changes we can make changes via [employer] or see her back sooner if need be.    Past Medical History:     Patient Active Problem List   Diagnosis    Migraine Headache    ACL tear     No past medical history on file.     Past Surgical History:     Past Surgical History:   Procedure Laterality Date    BIOPSY BREAST Right 2008    HYSTERECTOMY  10/2009    Partial    ZZC TOTAL ABDOM HYSTERECTOMY      Description: Total Abdominal Hysterectomy;  Recorded: 2012;        Social History:     Social History     Tobacco Use    Smoking status: Former     Current packs/day: 0.00     Types: Cigarettes     Quit date: 1994     Years since quittin.5    Smokeless tobacco: Never    Tobacco comments:     quit 22 yrs ago   Substance Use Topics    Alcohol use: Yes     Alcohol/week: 1.0 standard drink of alcohol     Comment: Rarely    Drug use: No        Family History:     Family History   Problem Relation Age of Onset    Diabetes Father     Breast Cancer Maternal Aunt 38.00    Cancer Mother         lung cancer    Other Cancer Mother         My mom was dx with metastatic small erica lung cancer 2019 &  6 weeks later 3/15/19         Medications:     Current Outpatient Medications   Medication Sig Dispense Refill    escitalopram (LEXAPRO) 20 MG tablet Take 1.5 tablets (30 mg) by mouth daily 135 tablet 3    estradiol (VIVELLE-DOT) 0.05 MG/24HR bi-weekly patch APPLY 1 PATCH EXTERNALLY TO THE SKIN 2 TIMES A WEEK 24 patch 0    hydrOXYzine HCl (ATARAX) 25 MG tablet TAKE 1 TABLET BY MOUTH EVERY 6 HOURS AS NEEDED FOR ANXIETY OR SLEEP 90 tablet 1    magnesium oxide (MAG-OX) 400 MG tablet Take 1 tablet (400 mg) by mouth daily 90 tablet 1    MULTIVITAMIN ORAL [MULTIVITAMIN ORAL] Take 1 capsule by mouth daily.      nortriptyline (PAMELOR) 10 MG capsule Take 1 capsule (10 mg) by mouth at bedtime 90 capsule 1    ondansetron (ZOFRAN) 4 MG tablet [ONDANSETRON (ZOFRAN) 4 MG TABLET] TAKE 1 TABLET BY MOUTH EVERY 8 HOURS AS NEEDED FOR NAUSEA OR VOMITING 30 tablet 11    Riboflavin 400 MG TABS Take 400 mg by mouth daily 90 tablet 1    topiramate (TOPAMAX) 100 MG tablet Take 1 tablet (100 mg) by mouth 2 times daily Take 50 mg in AM and 100 mg  tablet 1    ubrogepant (UBRELVY) 100 MG tablet Take 1 tablet (100 mg) by mouth at onset of headache (bad migraine headache) 9 tablet 5    ZOLMitriptan (ZOMIG) 5 MG tablet TAKE 1 TABLET BY MOUTH EVERY 2 HOURS AS NEEDED FOR MIGRAINE. MAX OF 2 TABS PER DAY 36 tablet 3     No current facility-administered medications for this visit.        Allergies:   No Known Allergies     Review of Systems:   As noted above     Physical Exam:   General: Seated comfortably in no acute distress.  Neurologic:     Mental Status: Fully alert, attentive and oriented. Speech clear and fluent, no paraphasic errors.     Cranial Nerves: Facial movements symmetric. Hearing not formally tested but intact to conversation.  No dysarthria.     Motor: No tremors or other abnormal movements observed.      Sensory:Not able to be tested virtually              Data: Pertinent prior to visit   Imaging Previously WNL         The total time of this encounter  today amounted to 30 minutes in total. This time included time spent with the patient, prep work, reviewing previous data ordering medications, and performing post visit documentation.    The longitudinal plan of care for migraine headaches was addressed during this visit. Due to the added complexity in care, I will continue to support Ms. Garcia in the subsequent management of this condition(s) and with the ongoing continuity of care of this condition(s).

## 2024-07-16 NOTE — PATIENT INSTRUCTIONS
Continue  Topiramate 100 mg BID  Magnesium oxide 400 mg Riboflavin (vitamin b2) 400 mg daily  Ubrelvy 100 mg PRN  Think about using OTC as neededs on same day as Ubrelvy if needed    Future options  Higher on topamax  Addition of nortriptyline as previously speculated

## 2024-07-16 NOTE — PROGRESS NOTES
Alisia is a 53 year old who is being evaluated via a billable video visit.    How would you like to obtain your AVS? [employer]  If the video visit is dropped, the invitation should be resent by: Text to cell phone: [phone]  Will anyone else be joining your video visit? No    Video-Visit Details    Type of service:  Video Visit   Originating Location (pt. Location): At work, in [place]    Distant Location (provider location):  On-site  Platform used for Video Visit: [employer] data 317-855 PM

## 2024-07-24 ENCOUNTER — TELEPHONE (OUTPATIENT)
Dept: NEUROLOGY | Facility: CLINIC | Age: 54
End: 2024-07-24

## 2024-07-24 DIAGNOSIS — G47.00 INSOMNIA, UNSPECIFIED TYPE: ICD-10-CM

## 2024-07-24 RX ORDER — HYDROXYZINE HYDROCHLORIDE 25 MG/1
TABLET, FILM COATED ORAL
Qty: 90 TABLET | Refills: 1 | Status: SHIPPED | OUTPATIENT
Start: 2024-07-24 | End: 2024-09-03

## 2024-07-24 NOTE — TELEPHONE ENCOUNTER
[facility]  Fax:[phone]  Spec: [phone]  [place]: [phone]

## 2024-07-28 NOTE — TELEPHONE ENCOUNTER
Prior Authorization Not Needed per Insurance    Medication: UBRELVY 100 MG PO TABS  Insurance Company: [employer] - Phone [phone] Fax [phone]  Expected CoPay: $    Pharmacy Filling the Rx: [facility] - [place], [place] - [address]   Pharmacy Notified: YES  Patient Notified: Instructed pharmacy to notify patient once order is ready.     Per review of ERX order for Ubrelvy was cancelled but claim details show it was able to process through [employer] insurance correctly. No pa needed.    Per review of prescription - PA already approved until 9/6/2024 - pharmacy billing for incorrect day supply should be 9 tabs for 30 day supply.     Cannot check ERX right now because cannot login. Will call pharmacy Monday.

## 2024-08-01 NOTE — PROGRESS NOTES
Assessment/Plan:    Establishing care with new doctor, encounter for  Establishing care today, all past medical history reviewed and updated in EMR.    Lipid screening  Due for cholesterol check.  - Lipid panel reflex to direct LDL Fasting    Diabetes mellitus screening  Given age, due for diabetes screening.  - Hemoglobin A1c    Screening mammogram for breast cancer  Due for mammogram in September, order placed today.  - MA Screen Bilateral w/[facility]    Other migraine without status migrainosus, not intractable  History of migraines, patient follows with neurology, labs as below.  - Basic metabolic panel  (Ca, Cl, CO2, Creat, Gluc, K, Na, BUN)  - CBC with platelets  - Vitamin D Deficiency    Hepatitis B vaccination status unknown  Hepatitis B vaccine status not documented in chart, will check titer with rest of labs today.  - Hepatitis B Surface Antibody    Encounter for hepatitis C screening test for low risk patient  Due for one-time hepatitis C screening.  - Hepatitis C antibody    Encounter for vaccination  Due for Tdap, administered today.  - TDAP 7+ (ADACEL,BOOSTRIX)    Anxiety  Depression, unspecified depression type  History of anxiety and depression, well-controlled on current regimen, no changes today, refill provided.  - escitalopram (LEXAPRO) 20 MG tablet  Dispense: 135 tablet; Refill: 3    Hormone replacement therapy  The patient is currently on hormone therapy with estrogen patch, refill provided today.  - estradiol (VIVELLE-DOT) 0.05 MG/24HR bi-weekly patch  Dispense: 24 patch; Refill: 3    Encounter for monitoring of ketogenic diet  Patient continues keto diet, has maintained excellent weight loss, we discussed BMI is in ideal range and no further weight loss would be recommended, labs as below.  - Vitamin D Deficiency  - Folate  - Vitamin B1 whole blood  - Iron and iron binding capacity  - Magnesium       Follow up: 1 year for physical, sooner as needed    Maegan Cota MD  [facility]  Clinic    Subjective:   Alisia Garcia is a 53 year old female is here today for establish care    -previously saw Dr Bahena - here to establish care - due for labs  -works at [employer] in [place]  -hx migraines - follows with neurology  -colon cancer screening UTD  -mammogram due in Sept    Answers submitted by the patient for this visit:  General Questionnaire (Submitted on 8/4/2024)  Chief Complaint: Chronic problems general questions HPI Form  What is the reason for your visit today? : The clinic said it is a mandatory appointment to meet the new provider. They require it!! They cslled it a  meet & greet your new provider appt   How many servings of fruits and vegetables do you eat daily?: 4 or more  On average, how many sweetened beverages do you drink each day (Examples: soda, juice, sweet tea, etc.  Do NOT count diet or artificially sweetened beverages)?: 0  How many minutes a day do you exercise enough to make your heart beat faster?: 30 to 60  How many days a week do you exercise enough to make your heart beat faster?: 3 or less  How many days per week do you miss taking your medication?: 0    Patient Active Problem List   Diagnosis    Migraine Headache    ACL tear     History reviewed. No pertinent past medical history.  Past Surgical History:   Procedure Laterality Date    BIOPSY BREAST Left 2008    cyst aspiration    TOTAL VAGINAL HYSTERECTOMY  2009    ovaries in place    XR KNEE SURGERY EDWARD LEFT      ACL     Current Outpatient Medications   Medication Sig Dispense Refill    escitalopram (LEXAPRO) 20 MG tablet Take 1.5 tablets (30 mg) by mouth daily 135 tablet 3    estradiol (VIVELLE-DOT) 0.05 MG/24HR bi-weekly patch APPLY 1 PATCH EXTERNALLY TO THE SKIN 2 TIMES A WEEK 24 patch 3    hydrOXYzine HCl (ATARAX) 25 MG tablet TAKE ONE TABLET BY MOUTH EVERY 6 HOURS AS NEEDED FOR ANXIETY OR SLEEP 90 tablet 1    magnesium oxide (MAG-OX) 400 MG tablet Take 1 tablet (400 mg) by mouth daily 90 tablet 1    MULTIVITAMIN  ORAL [MULTIVITAMIN ORAL] Take 1 capsule by mouth daily.      ondansetron (ZOFRAN) 4 MG tablet [ONDANSETRON (ZOFRAN) 4 MG TABLET] TAKE 1 TABLET BY MOUTH EVERY 8 HOURS AS NEEDED FOR NAUSEA OR VOMITING 30 tablet 11    Riboflavin 400 MG TABS Take 400 mg by mouth daily 90 tablet 1    topiramate (TOPAMAX) 100 MG tablet Take 1 tablet (100 mg) by mouth 2 times daily 180 tablet 1    ubrogepant (UBRELVY) 100 MG tablet Take 1 tablet (100 mg) by mouth at onset of headache (bad migraine headache) 9 tablet 11    ZOLMitriptan (ZOMIG) 5 MG tablet TAKE 1 TABLET BY MOUTH EVERY 2 HOURS AS NEEDED FOR MIGRAINE. MAX OF 2 TABS PER DAY 36 tablet 3     No current facility-administered medications for this visit.     No Known Allergies  Social History     Socioeconomic History    Marital status:      Spouse name: Not on file    Number of children: Not on file    Years of education: Not on file    Highest education level: Not on file   Occupational History    Not on file   Tobacco Use    Smoking status: Former     Current packs/day: 0.00     Types: Cigarettes     Quit date: 1994     Years since quittin.6    Smokeless tobacco: Never    Tobacco comments:     : quit 29 yrs ago   Vaping Use    Vaping status: Never Used   Substance and Sexual Activity    Alcohol use: Yes     Alcohol/week: 1.0 standard drink of alcohol     Comment: Rarely    Drug use: No    Sexual activity: Yes     Partners: Male     Birth control/protection: Female Surgical     Comment: hyst   Other Topics Concern    Parent/sibling w/ CABG, MI or angioplasty before 65F 55M? No   Social History Narrative    Not on file     Social Determinants of Health     Financial Resource Strain: Low Risk  (2024)    Financial Resource Strain     Within the past 12 months, have you or your family members you live with been unable to get utilities (heat, electricity) when it was really needed?: No   Food Insecurity: Low Risk  (2024)    Food Insecurity     Within the  "past 12 months, did you worry that your food would run out before you got money to buy more?: No     Within the past 12 months, did the food you bought just not last and you didn t have money to get more?: No   Transportation Needs: Low Risk  (2024)    Transportation Needs     Within the past 12 months, has lack of transportation kept you from medical appointments, getting your medicines, non-medical meetings or appointments, work, or from getting things that you need?: No   Physical Activity: Not on file   Stress: Not on file   Social Connections: Not on file   Interpersonal Safety: Low Risk  (2024)    Interpersonal Safety     Do you feel physically and emotionally safe where you currently live?: Yes     Within the past 12 months, have you been hit, slapped, kicked or otherwise physically hurt by someone?: No     Within the past 12 months, have you been humiliated or emotionally abused in other ways by your partner or ex-partner?: No   Housing Stability: Low Risk  (2024)    Housing Stability     Do you have housing? : Yes     Are you worried about losing your housing?: No     Family History   Problem Relation Age of Onset    Cancer Mother         dx with metastatic small cell lung cancer 2019 &  6 weeks later 3/15/19; tobacco use    Diabetes Father     No Known Problems Sister     Glaucoma Maternal Grandmother     Heart Disease Maternal Grandfather     No Known Problems Paternal Grandmother     No Known Problems Paternal Grandfather     Breast Cancer Maternal Aunt 38        maternal half sibling     Review of systems is as stated in HPI, and the remainder of system review is otherwise negative.    Objective:     BP 96/70   Pulse 72   Temp 98.1  F (36.7  C) (Temporal)   Resp 16   Ht 1.664 m (5' 5.5\")   Wt 55.7 kg (122 lb 14.4 oz)   SpO2 98%   BMI 20.14 kg/m      General appearance: awake, NAD  HEENT: atraumatic, normocephalic, no scleral icterus or injection  Lungs: breathing comfortably " on room air  Extremities: moving all extremities  Neuro: alert, CNs grossly intact, no focal deficits appreciated  Psych: normal mood/affect/behavior, answering questions appropriately, linear thought process

## 2024-08-05 DIAGNOSIS — G43.909 MIGRAINE WITHOUT STATUS MIGRAINOSUS, NOT INTRACTABLE, UNSPECIFIED MIGRAINE TYPE: ICD-10-CM

## 2024-08-05 RX ORDER — ZOLMITRIPTAN 5 MG/1
TABLET, FILM COATED ORAL
Qty: 36 TABLET | Refills: 3 | Status: SHIPPED | OUTPATIENT
Start: 2024-08-05

## 2024-08-05 NOTE — TELEPHONE ENCOUNTER
Medication Question or Refill    Contacts       Contact Date/Time Type Contact Phone/Fax    08/05/2024 01:01 PM CDT Fax (Incoming) [facility] - [place], [place] - [address]  (Pharmacy) [phone]            What medication are you calling about (include dose and sig)?: ZOLMitriptan (ZOMIG) 5 MG tablet     Preferred Pharmacy:  [facility] - [place], [place] - [address]   [address] [facility] [address]  Phone: [phone] Fax: [phone]      Controlled Substance Agreement on file:   CSA -- Patient Level:    CSA: None found at the patient level.       Who prescribed the medication?:Bahena    Do you need a refill? Yes    When did you use the medication last? N/A

## 2024-08-09 ENCOUNTER — OFFICE VISIT (OUTPATIENT)
Dept: FAMILY MEDICINE | Facility: CLINIC | Age: 54
End: 2024-08-09
Payer: COMMERCIAL

## 2024-08-09 VITALS
HEART RATE: 72 BPM | SYSTOLIC BLOOD PRESSURE: 96 MMHG | BODY MASS INDEX: 19.75 KG/M2 | RESPIRATION RATE: 16 BRPM | OXYGEN SATURATION: 98 % | TEMPERATURE: 98.1 F | DIASTOLIC BLOOD PRESSURE: 70 MMHG | HEIGHT: 66 IN | WEIGHT: 122.9 LBS

## 2024-08-09 DIAGNOSIS — Z12.31 SCREENING MAMMOGRAM FOR BREAST CANCER: ICD-10-CM

## 2024-08-09 DIAGNOSIS — Z23 ENCOUNTER FOR VACCINATION: ICD-10-CM

## 2024-08-09 DIAGNOSIS — Z78.9 HEPATITIS B VACCINATION STATUS UNKNOWN: ICD-10-CM

## 2024-08-09 DIAGNOSIS — Z13.220 LIPID SCREENING: ICD-10-CM

## 2024-08-09 DIAGNOSIS — F41.9 ANXIETY: ICD-10-CM

## 2024-08-09 DIAGNOSIS — Z11.59 ENCOUNTER FOR HEPATITIS C SCREENING TEST FOR LOW RISK PATIENT: ICD-10-CM

## 2024-08-09 DIAGNOSIS — Z76.89 ESTABLISHING CARE WITH NEW DOCTOR, ENCOUNTER FOR: Primary | ICD-10-CM

## 2024-08-09 DIAGNOSIS — Z13.1 DIABETES MELLITUS SCREENING: ICD-10-CM

## 2024-08-09 DIAGNOSIS — F32.A DEPRESSION, UNSPECIFIED DEPRESSION TYPE: ICD-10-CM

## 2024-08-09 DIAGNOSIS — Z79.890 HORMONE REPLACEMENT THERAPY: ICD-10-CM

## 2024-08-09 DIAGNOSIS — G43.809 OTHER MIGRAINE WITHOUT STATUS MIGRAINOSUS, NOT INTRACTABLE: ICD-10-CM

## 2024-08-09 DIAGNOSIS — Z71.3 ENCOUNTER FOR MONITORING OF KETOGENIC DIET: ICD-10-CM

## 2024-08-09 LAB
ANION GAP SERPL CALCULATED.3IONS-SCNC: 11 MMOL/L (ref 7–15)
BUN SERPL-MCNC: 22.8 MG/DL (ref 6–20)
CALCIUM SERPL-MCNC: 9.4 MG/DL (ref 8.8–10.4)
CHLORIDE SERPL-SCNC: 110 MMOL/L (ref 98–107)
CHOLEST SERPL-MCNC: 173 MG/DL
CREAT SERPL-MCNC: 0.66 MG/DL (ref 0.51–0.95)
EGFRCR SERPLBLD CKD-EPI 2021: >90 ML/MIN/1.73M2
ERYTHROCYTE [DISTWIDTH] IN BLOOD BY AUTOMATED COUNT: 13.2 % (ref 10–15)
FASTING STATUS PATIENT QL REPORTED: YES
FASTING STATUS PATIENT QL REPORTED: YES
FOLATE SERPL-MCNC: >40 NG/ML (ref 4.6–34.8)
GLUCOSE SERPL-MCNC: 86 MG/DL (ref 70–99)
HBA1C MFR BLD: 4.8 % (ref 0–5.6)
HBV SURFACE AB SERPL IA-ACNC: <3.5 M[IU]/ML
HBV SURFACE AB SERPL IA-ACNC: NONREACTIVE M[IU]/ML
HCO3 SERPL-SCNC: 20 MMOL/L (ref 22–29)
HCT VFR BLD AUTO: 37.6 % (ref 35–47)
HCV AB SERPL QL IA: NONREACTIVE
HDLC SERPL-MCNC: 77 MG/DL
HGB BLD-MCNC: 12.5 G/DL (ref 11.7–15.7)
IRON BINDING CAPACITY (ROCHE): 250 UG/DL (ref 240–430)
IRON SATN MFR SERPL: 31 % (ref 15–46)
IRON SERPL-MCNC: 77 UG/DL (ref 37–145)
LDLC SERPL CALC-MCNC: 89 MG/DL
MAGNESIUM SERPL-MCNC: 2.1 MG/DL (ref 1.7–2.3)
MCH RBC QN AUTO: 32.4 PG (ref 26.5–33)
MCHC RBC AUTO-ENTMCNC: 33.2 G/DL (ref 31.5–36.5)
MCV RBC AUTO: 97 FL (ref 78–100)
NONHDLC SERPL-MCNC: 96 MG/DL
PLATELET # BLD AUTO: 190 10E3/UL (ref 150–450)
POTASSIUM SERPL-SCNC: 4 MMOL/L (ref 3.4–5.3)
RBC # BLD AUTO: 3.86 10E6/UL (ref 3.8–5.2)
SODIUM SERPL-SCNC: 141 MMOL/L (ref 135–145)
TRIGL SERPL-MCNC: 34 MG/DL
VIT D+METAB SERPL-MCNC: 83 NG/ML (ref 20–50)
WBC # BLD AUTO: 4.9 10E3/UL (ref 4–11)

## 2024-08-09 PROCEDURE — 82306 VITAMIN D 25 HYDROXY: CPT | Performed by: FAMILY MEDICINE

## 2024-08-09 PROCEDURE — 83036 HEMOGLOBIN GLYCOSYLATED A1C: CPT | Performed by: FAMILY MEDICINE

## 2024-08-09 PROCEDURE — 82746 ASSAY OF FOLIC ACID SERUM: CPT | Performed by: FAMILY MEDICINE

## 2024-08-09 PROCEDURE — 86803 HEPATITIS C AB TEST: CPT | Performed by: FAMILY MEDICINE

## 2024-08-09 PROCEDURE — 83550 IRON BINDING TEST: CPT | Performed by: FAMILY MEDICINE

## 2024-08-09 PROCEDURE — 36415 COLL VENOUS BLD VENIPUNCTURE: CPT | Performed by: FAMILY MEDICINE

## 2024-08-09 PROCEDURE — 86706 HEP B SURFACE ANTIBODY: CPT | Performed by: FAMILY MEDICINE

## 2024-08-09 PROCEDURE — 83540 ASSAY OF IRON: CPT | Performed by: FAMILY MEDICINE

## 2024-08-09 PROCEDURE — 85027 COMPLETE CBC AUTOMATED: CPT | Performed by: FAMILY MEDICINE

## 2024-08-09 PROCEDURE — 80048 BASIC METABOLIC PNL TOTAL CA: CPT | Performed by: FAMILY MEDICINE

## 2024-08-09 PROCEDURE — 90715 TDAP VACCINE 7 YRS/> IM: CPT | Performed by: FAMILY MEDICINE

## 2024-08-09 PROCEDURE — 99000 SPECIMEN HANDLING OFFICE-LAB: CPT | Performed by: FAMILY MEDICINE

## 2024-08-09 PROCEDURE — 83735 ASSAY OF MAGNESIUM: CPT | Performed by: FAMILY MEDICINE

## 2024-08-09 PROCEDURE — 90471 IMMUNIZATION ADMIN: CPT | Performed by: FAMILY MEDICINE

## 2024-08-09 PROCEDURE — 84425 ASSAY OF VITAMIN B-1: CPT | Mod: 90 | Performed by: FAMILY MEDICINE

## 2024-08-09 PROCEDURE — 99214 OFFICE O/P EST MOD 30 MIN: CPT | Mod: 25 | Performed by: FAMILY MEDICINE

## 2024-08-09 PROCEDURE — 80061 LIPID PANEL: CPT | Performed by: FAMILY MEDICINE

## 2024-08-09 RX ORDER — ESCITALOPRAM OXALATE 20 MG/1
30 TABLET ORAL DAILY
Qty: 135 TABLET | Refills: 3 | Status: SHIPPED | OUTPATIENT
Start: 2024-08-09

## 2024-08-09 RX ORDER — ESTRADIOL 0.05 MG/D
PATCH, EXTENDED RELEASE TRANSDERMAL
Qty: 24 PATCH | Refills: 3 | Status: SHIPPED | OUTPATIENT
Start: 2024-08-09

## 2024-08-15 LAB — VIT B1 PYROPHOSHATE BLD-SCNC: 157 NMOL/L

## 2024-08-18 ENCOUNTER — MYC MEDICAL ADVICE (OUTPATIENT)
Dept: FAMILY MEDICINE | Facility: CLINIC | Age: 54
End: 2024-08-18
Payer: COMMERCIAL

## 2024-08-18 DIAGNOSIS — Z23 ENCOUNTER FOR VACCINATION: Primary | ICD-10-CM

## 2024-08-20 NOTE — ADDENDUM NOTE
Addended by: FABRIZIO SOTO on: 8/20/2024 08:10 AM     Modules accepted: Orders    
Alert and oriented to person, place, time/situation. Depressed mood with SI.

## 2024-08-31 DIAGNOSIS — G47.00 INSOMNIA, UNSPECIFIED TYPE: ICD-10-CM

## 2024-09-03 RX ORDER — HYDROXYZINE HYDROCHLORIDE 25 MG/1
TABLET, FILM COATED ORAL
Qty: 90 TABLET | Refills: 0 | Status: SHIPPED | OUTPATIENT
Start: 2024-09-03 | End: 2024-09-20

## 2024-09-20 ENCOUNTER — ALLIED HEALTH/NURSE VISIT (OUTPATIENT)
Dept: FAMILY MEDICINE | Facility: CLINIC | Age: 54
End: 2024-09-20
Payer: COMMERCIAL

## 2024-09-20 DIAGNOSIS — Z23 ENCOUNTER FOR VACCINATION: ICD-10-CM

## 2024-09-20 DIAGNOSIS — G47.00 INSOMNIA, UNSPECIFIED TYPE: ICD-10-CM

## 2024-09-20 PROCEDURE — 99207 PR NO CHARGE NURSE ONLY: CPT

## 2024-09-20 RX ORDER — HYDROXYZINE HYDROCHLORIDE 25 MG/1
TABLET, FILM COATED ORAL
Qty: 90 TABLET | Refills: 0 | Status: SHIPPED | OUTPATIENT
Start: 2024-09-20

## 2024-09-20 NOTE — PROGRESS NOTES
Prior to immunization administration, verified patients identity using patient s name and date of birth. Please see Immunization Activity for additional information.     Screening Questionnaire for Adult Immunization    Are you sick today?   No   Do you have allergies to medications, food, a vaccine component or latex?   No   Have you ever had a serious reaction after receiving a vaccination?   No   Do you have a long-term health problem with heart, lung, kidney, or metabolic disease (e.g., diabetes), asthma, a blood disorder, no spleen, complement component deficiency, a cochlear implant, or a spinal fluid leak?  Are you on long-term aspirin therapy?   No   Do you have cancer, leukemia, HIV/AIDS, or any other immune system problem?   No   Do you have a parent, brother, or sister with an immune system problem?   No   In the past 3 months, have you taken medications that affect  your immune system, such as prednisone, other steroids, or anticancer drugs; drugs for the treatment of rheumatoid arthritis, Crohn s disease, or psoriasis; or have you had radiation treatments?   Yes- pt received a cortisone shot in the AM on 9/20/2024.    Have you had a seizure, or a brain or other nervous system problem?   No   During the past year, have you received a transfusion of blood or blood    products, or been given immune (gamma) globulin or antiviral drug?   No   For women: Are you pregnant or is there a chance you could become       pregnant during the next month?   No   Have you received any vaccinations in the past 4 weeks?   No     Immunization questionnaire was positive for at least one answer.  Notified Dr. Cota whom advised that the patient needs to wait 2 weeks after receiving a cortisone injection for immunizations. Patient has been re-scheduled.     Screening performed by Shanae Ruiz CMA on 9/20/2024 at 10:30 AM.

## 2024-09-29 ENCOUNTER — HEALTH MAINTENANCE LETTER (OUTPATIENT)
Age: 54
End: 2024-09-29

## 2024-10-03 ENCOUNTER — HOSPITAL ENCOUNTER (OUTPATIENT)
Dept: MAMMOGRAPHY | Facility: CLINIC | Age: 54
Discharge: HOME | End: 2024-10-03
Attending: FAMILY MEDICINE | Admitting: FAMILY MEDICINE
Payer: COMMERCIAL

## 2024-10-03 DIAGNOSIS — Z12.31 SCREENING MAMMOGRAM FOR BREAST CANCER: ICD-10-CM

## 2024-10-03 PROCEDURE — 77063 BREAST TOMOSYNTHESIS BI: CPT

## 2024-10-09 ENCOUNTER — ALLIED HEALTH/NURSE VISIT (OUTPATIENT)
Dept: FAMILY MEDICINE | Facility: CLINIC | Age: 54
End: 2024-10-09
Payer: COMMERCIAL

## 2024-10-09 DIAGNOSIS — Z23 ENCOUNTER FOR IMMUNIZATION: Primary | ICD-10-CM

## 2024-10-09 PROCEDURE — 90471 IMMUNIZATION ADMIN: CPT

## 2024-10-09 PROCEDURE — 90472 IMMUNIZATION ADMIN EACH ADD: CPT

## 2024-10-09 PROCEDURE — 90746 HEPB VACCINE 3 DOSE ADULT IM: CPT

## 2024-10-09 PROCEDURE — 90673 RIV3 VACCINE NO PRESERV IM: CPT

## 2024-10-09 PROCEDURE — 90480 ADMN SARSCOV2 VAC 1/ONLY CMP: CPT

## 2024-10-09 PROCEDURE — 99207 PR NO CHARGE NURSE ONLY: CPT

## 2024-10-09 PROCEDURE — 91320 SARSCV2 VAC 30MCG TRS-SUC IM: CPT

## 2024-10-09 PROCEDURE — 90750 HZV VACC RECOMBINANT IM: CPT

## 2024-10-10 DIAGNOSIS — G47.00 INSOMNIA, UNSPECIFIED TYPE: ICD-10-CM

## 2024-10-10 RX ORDER — HYDROXYZINE HYDROCHLORIDE 25 MG/1
TABLET, FILM COATED ORAL
Qty: 90 TABLET | Refills: 0 | Status: SHIPPED | OUTPATIENT
Start: 2024-10-10 | End: 2024-10-30

## 2024-10-15 ENCOUNTER — TELEPHONE (OUTPATIENT)
Dept: NEUROLOGY | Facility: CLINIC | Age: 54
End: 2024-10-15

## 2024-10-15 NOTE — TELEPHONE ENCOUNTER
[facility]  Fax:[phone]  Spec: [phone]  MO: [phone]

## 2024-10-16 ENCOUNTER — E-VISIT (OUTPATIENT)
Dept: FAMILY MEDICINE | Facility: CLINIC | Age: 54
End: 2024-10-16
Payer: COMMERCIAL

## 2024-10-16 ENCOUNTER — LAB (OUTPATIENT)
Dept: LAB | Facility: CLINIC | Age: 54
End: 2024-10-16
Attending: STUDENT IN AN ORGANIZED HEALTH CARE EDUCATION/TRAINING PROGRAM
Payer: COMMERCIAL

## 2024-10-16 DIAGNOSIS — J02.9 ACUTE VIRAL PHARYNGITIS: Primary | ICD-10-CM

## 2024-10-16 PROCEDURE — 99421 OL DIG E/M SVC 5-10 MIN: CPT | Performed by: STUDENT IN AN ORGANIZED HEALTH CARE EDUCATION/TRAINING PROGRAM

## 2024-10-17 ENCOUNTER — VIRTUAL VISIT (OUTPATIENT)
Dept: URGENT CARE | Facility: CLINIC | Age: 54
End: 2024-10-17
Payer: COMMERCIAL

## 2024-10-17 DIAGNOSIS — J01.90 ACUTE NON-RECURRENT SINUSITIS, UNSPECIFIED LOCATION: Primary | ICD-10-CM

## 2024-10-17 PROCEDURE — 99441 PR PHYSICIAN TELEPHONE EVALUATION 5-10 MIN: CPT | Mod: 93

## 2024-10-17 NOTE — TELEPHONE ENCOUNTER
PA Initiation    Medication: UBRELVY 100 MG PO TABS  Insurance Company: [employer] - Phone [phone] Fax [phone]  Pharmacy Filling the Rx: [facility] - [place], [place] - [facility] [address]  Filling Pharmacy Phone: [phone]  Filling Pharmacy Fax:    Start Date: 10/17/2024  Retail Pharmacy Prior Authorization Team   Phone: [phone]

## 2024-10-17 NOTE — PROGRESS NOTES
Alisia is a 54 year old who is being evaluated via a billable telephone visit.          Assessment & Plan     Acute non-recurrent sinusitis, unspecified location    - amoxicillin-clavulanate (AUGMENTIN) 875-125 MG tablet; Take 1 tablet by mouth 2 times daily.        Return in about 1 week (around 10/24/2024), or if symptoms worsen or fail to improve.    Subjective   Alisia is a 54 year old, presenting for the following health issues:  No chief complaint on file.    HPI     Alisia presents with ongoing cough, sore throat sinus pain and pressure that is severe along with headache for over a week now.  Has been using over-the-counter medications to help with symptom management.  COVID flu and strep test were all negative earlier this week.  No history of deviated septum nasal polyps or sinus surgeries.  No history of frequent sinus infections.  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        Review of Systems  Constitutional, HEENT, cardiovascular, pulmonary, gi and gu systems are negative, except as otherwise noted.      Objective           Vitals:  No vitals were obtained today due to virtual visit.    Physical Exam   GENERAL: alert and no distress  PSYCH: Appropriate affect, tone, and pace of words          Telephone visit lasted 7 minutes    Type of service:   Visit   Originating Location (pt. Location): Home    Distant Location (provider location):  Off-site  Platform used for Video Visit: telephone  Signed Electronically by: [facility]

## 2024-10-17 NOTE — TELEPHONE ENCOUNTER
Prior Authorization Approval    Medication: UBRELVY 100 MG PO TABS  Authorization Effective Date: 10/17/2024  Authorization Expiration Date: 10/17/2025  Approved Dose/Quantity:   Reference #:     Insurance Company: [employer] - Phone [phone] Fax [phone]  Expected CoPay: $    CoPay Card Available:      Financial Assistance Needed: No  Which Pharmacy is filling the prescription: [facility] - [place], [place] - [address]  Pharmacy Notified: Yes  Patient Notified: The pharmacy will notify the patient.

## 2024-10-30 ENCOUNTER — VIRTUAL VISIT (OUTPATIENT)
Dept: FAMILY MEDICINE | Facility: CLINIC | Age: 54
End: 2024-10-30
Payer: COMMERCIAL

## 2024-10-30 DIAGNOSIS — J01.91 ACUTE RECURRENT SINUSITIS, UNSPECIFIED LOCATION: Primary | ICD-10-CM

## 2024-10-30 DIAGNOSIS — G47.00 INSOMNIA, UNSPECIFIED TYPE: ICD-10-CM

## 2024-10-30 PROBLEM — J01.90 ACUTE SINUSITIS: Status: ACTIVE | Noted: 2024-10-30

## 2024-10-30 PROCEDURE — G2211 COMPLEX E/M VISIT ADD ON: HCPCS | Mod: 95 | Performed by: PHYSICIAN ASSISTANT

## 2024-10-30 PROCEDURE — 99213 OFFICE O/P EST LOW 20 MIN: CPT | Mod: 95 | Performed by: PHYSICIAN ASSISTANT

## 2024-10-30 RX ORDER — DOXYCYCLINE HYCLATE 100 MG
100 TABLET ORAL 2 TIMES DAILY
Qty: 14 TABLET | Refills: 0 | Status: SHIPPED | OUTPATIENT
Start: 2024-10-30 | End: 2024-11-06

## 2024-10-30 RX ORDER — HYDROXYZINE HYDROCHLORIDE 25 MG/1
TABLET, FILM COATED ORAL
Qty: 90 TABLET | Refills: 0 | Status: SHIPPED | OUTPATIENT
Start: 2024-10-30

## 2024-10-30 NOTE — PROGRESS NOTES
Alisia is a 54 year old who is being evaluated via a billable video visit.    How would you like to obtain your AVS? MyChart  If the video visit is dropped, the invitation should be resent by: Text to cell phone: [phone]  Will anyone else be joining your video visit? No      Assessment & Plan   Problem List Items Addressed This Visit       Acute sinusitis - Primary     Presents today via video visit for evaluation of continued symptoms despite treatment with Augmentin.  She was seen in urgent care on 10/17/2024 and placed on Augmentin for acute sinusitis.  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  She has missed 3 days of work due to illness.  She continues to have headaches, cough, yellowish-green sinus drainage and fever.  Fever has been up to 101.7.  She is not immunocompromise.  Denies chest pain or shortness of breath.  No abdominal pain.  She has had some nausea but no vomiting or diarrhea.  She also has laryngitis.  She did previous negative COVID test.  He also continues to have sinus pressure and pain.    Will proceed with chest x-ray, CBC and CRP at this time.  We discussed this may be a viral illness, schedule post vaccine reaction, recurrent sinus infection or pneumonia.  Will determine appropriate treatment based on additional studies.  Discussed that if she develops worsening symptoms such as chest pain, shortness of breath or worsening symptoms she should go to the emergency room.         Relevant Orders    CBC with platelets and differential    CRP, inflammation    XR Chest 2 Views      Subjective   Alisia is a 54 year old, presenting for the following health issues:  Symptoms (home Covid; it's negative. Ears pop with every swallow and face between the bridge of nose and eyebrows is painful. Have little to no voice, and a fever this morning of 101.7. This exact problem was treated 10/16? felt better but now it's back with a vengeance. Developed a lot of mucous in throat which makes her  "cough. Going 3rd day of missed work.)        10/30/2024    11:28 AM   Additional Questions   Roomed by Justin Brownlee MA   Accompanied by Self         10/30/2024    11:28 AM   Patient Reported Additional Medications   Patient reports taking the following new medications None     History of Present Illness       Reason for visit:  Illness  Symptom onset:  1-3 days ago  Symptoms include:  Laryngitis, ear popping with every swallow, sonis congestion, fever, cough  Symptom intensity:  Moderate  Symptom progression:  Worsening  Had these symptoms before:  Yes  Has tried/received treatment for these symptoms:  Yes  Previous treatment was successful:  Yes  Prior treatment description:  Aigmentin 10/16 made it better initally but now its back  What makes it worse:  No  What makes it better:  Meds. Delsyn, mucous tablets, cough drops, tyl/motrin warm tea with honey, sleep   She is taking medications regularly.             Objective    Vitals - Patient Reported  Weight (Patient Reported): 55.8 kg (123 lb)  Height (Patient Reported): 166.4 cm (5' 5.5\")  BMI (Based on Pt Reported Ht/Wt): 20.16        Physical Exam   GENERAL: alert and no distress  EYES: Eyes grossly normal to inspection.  No discharge or erythema, or obvious scleral/conjunctival abnormalities.  RESP: No audible wheeze, cough, or visible cyanosis.    SKIN: Visible skin clear. No significant rash, abnormal pigmentation or lesions.  NEURO: Cranial nerves grossly intact.  Mentation and speech appropriate for age.  PSYCH: Appropriate affect, tone, and pace of words          Video-Visit Details    Type of service:  Video Visit   Originating Location (pt. Location): Other sisters home  Distant Location (provider location):  On-site  Platform used for Video Visit: [employer] ([employer])  Signed Electronically by: Lola Guadarrama PA-C    "

## 2024-10-30 NOTE — ASSESSMENT & PLAN NOTE
Presents today via video visit for evaluation of continued symptoms despite treatment with Augmentin.  She was seen in urgent care on 10/17/2024 and placed on Augmentin for acute sinusitis.  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  She has missed 3 days of work due to illness.  She continues to have headaches, cough, yellowish-green sinus drainage and fever.  Fever has been up to 101.7.  She is not immunocompromise.  Denies chest pain or shortness of breath.  No abdominal pain.  She has had some nausea but no vomiting or diarrhea.  She also has laryngitis.  She did previous negative COVID test.  He also continues to have sinus pressure and pain.    Will proceed with chest x-ray, CBC and CRP at this time.  We discussed this may be a viral illness, schedule post vaccine reaction, recurrent sinus infection or pneumonia.  Will determine appropriate treatment based on additional studies.  Discussed that if she develops worsening symptoms such as chest pain, shortness of breath or worsening symptoms she should go to the emergency room.

## 2024-10-31 ENCOUNTER — LAB (OUTPATIENT)
Dept: LAB | Facility: CLINIC | Age: 54
End: 2024-10-31
Attending: PHYSICIAN ASSISTANT
Payer: COMMERCIAL

## 2024-10-31 ENCOUNTER — HOSPITAL ENCOUNTER (OUTPATIENT)
Dept: RADIOLOGY | Facility: CLINIC | Age: 54
Discharge: HOME | End: 2024-10-31
Attending: PHYSICIAN ASSISTANT
Payer: COMMERCIAL

## 2024-10-31 DIAGNOSIS — J01.91 ACUTE RECURRENT SINUSITIS, UNSPECIFIED LOCATION: ICD-10-CM

## 2024-10-31 LAB
BASOPHILS # BLD AUTO: 0.1 10E3/UL (ref 0–0.2)
BASOPHILS NFR BLD AUTO: 1 %
CRP SERPL-MCNC: 5.62 MG/L
EOSINOPHIL # BLD AUTO: 0.2 10E3/UL (ref 0–0.7)
EOSINOPHIL NFR BLD AUTO: 3 %
ERYTHROCYTE [DISTWIDTH] IN BLOOD BY AUTOMATED COUNT: 12.9 % (ref 10–15)
HCT VFR BLD AUTO: 35.7 % (ref 35–47)
HGB BLD-MCNC: 12 G/DL (ref 11.7–15.7)
IMM GRANULOCYTES # BLD: 0 10E3/UL
IMM GRANULOCYTES NFR BLD: 0 %
LYMPHOCYTES # BLD AUTO: 2.4 10E3/UL (ref 0.8–5.3)
LYMPHOCYTES NFR BLD AUTO: 41 %
MCH RBC QN AUTO: 32.4 PG (ref 26.5–33)
MCHC RBC AUTO-ENTMCNC: 33.6 G/DL (ref 31.5–36.5)
MCV RBC AUTO: 97 FL (ref 78–100)
MONOCYTES # BLD AUTO: 0.4 10E3/UL (ref 0–1.3)
MONOCYTES NFR BLD AUTO: 7 %
NEUTROPHILS # BLD AUTO: 2.9 10E3/UL (ref 1.6–8.3)
NEUTROPHILS NFR BLD AUTO: 49 %
NRBC # BLD AUTO: 0 10E3/UL
NRBC BLD AUTO-RTO: 0 /100
PLATELET # BLD AUTO: 202 10E3/UL (ref 150–450)
RBC # BLD AUTO: 3.7 10E6/UL (ref 3.8–5.2)
WBC # BLD AUTO: 5.9 10E3/UL (ref 4–11)

## 2024-10-31 PROCEDURE — 85004 AUTOMATED DIFF WBC COUNT: CPT

## 2024-10-31 PROCEDURE — 36415 COLL VENOUS BLD VENIPUNCTURE: CPT

## 2024-10-31 PROCEDURE — 71046 X-RAY EXAM CHEST 2 VIEWS: CPT

## 2024-10-31 PROCEDURE — 86140 C-REACTIVE PROTEIN: CPT

## 2024-11-01 ENCOUNTER — MYC MEDICAL ADVICE (OUTPATIENT)
Dept: FAMILY MEDICINE | Facility: CLINIC | Age: 54
End: 2024-11-01
Payer: COMMERCIAL

## 2024-11-01 DIAGNOSIS — R11.0 NAUSEA: ICD-10-CM

## 2024-11-02 RX ORDER — ONDANSETRON 4 MG/1
TABLET, FILM COATED ORAL
Qty: 30 TABLET | Refills: 11 | Status: SHIPPED | OUTPATIENT
Start: 2024-11-02

## 2024-11-19 DIAGNOSIS — G47.00 INSOMNIA, UNSPECIFIED TYPE: ICD-10-CM

## 2024-11-19 RX ORDER — HYDROXYZINE HYDROCHLORIDE 25 MG/1
TABLET, FILM COATED ORAL
Qty: 90 TABLET | Refills: 0 | OUTPATIENT
Start: 2024-11-19

## 2024-11-19 NOTE — TELEPHONE ENCOUNTER
Writer called patient and left message to return call to clinic.     If patient returns call please  route to nurse queue to discuss medication request .    Per Dr. Cota a refill for 90 tabs was sent in approximately 2 weeks ago. Please ask pt if she is already out of this medication. Please clarify how often patient is using/needing this medication.    Alina Duff RN  [facility]

## 2024-11-19 NOTE — TELEPHONE ENCOUNTER
Patient called back to clinic. Patient states she does not need this medication at this time, and still has medication remaining from recent refill. Patient states this refill request was sent in error, as she was attempting to enroll a different prescription in auto-refills with the pharmacy.    Routing to provider to refuse refill.    Alina Duff RN  [facility]   [de-identified] : reviewed the xrays and the case with her discussion of optinos would rec PT lidocaine patch if not getting consider MRis

## 2024-12-04 DIAGNOSIS — G43.819 OTHER MIGRAINE WITHOUT STATUS MIGRAINOSUS, INTRACTABLE: ICD-10-CM

## 2024-12-04 RX ORDER — METHYLPREDNISOLONE 4 MG/1
TABLET ORAL
Qty: 21 TABLET | Refills: 0 | Status: SHIPPED | OUTPATIENT
Start: 2024-12-04

## 2024-12-04 RX ORDER — TOPIRAMATE 100 MG/1
150 TABLET, FILM COATED ORAL 2 TIMES DAILY
Qty: 270 TABLET | Refills: 1 | Status: SHIPPED | OUTPATIENT
Start: 2024-12-04

## 2024-12-10 DIAGNOSIS — G47.00 INSOMNIA, UNSPECIFIED TYPE: ICD-10-CM

## 2024-12-10 RX ORDER — HYDROXYZINE HYDROCHLORIDE 25 MG/1
TABLET, FILM COATED ORAL
Qty: 90 TABLET | Refills: 1 | Status: SHIPPED | OUTPATIENT
Start: 2024-12-10

## 2025-01-18 DIAGNOSIS — G47.00 INSOMNIA, UNSPECIFIED TYPE: ICD-10-CM

## 2025-01-20 RX ORDER — HYDROXYZINE HYDROCHLORIDE 25 MG/1
TABLET, FILM COATED ORAL
Qty: 90 TABLET | Refills: 1 | OUTPATIENT
Start: 2025-01-20

## 2025-03-12 DIAGNOSIS — G43.909 MIGRAINE WITHOUT STATUS MIGRAINOSUS, NOT INTRACTABLE, UNSPECIFIED MIGRAINE TYPE: ICD-10-CM

## 2025-03-12 RX ORDER — ZOLMITRIPTAN 5 MG/1
TABLET, FILM COATED ORAL
Qty: 36 TABLET | Refills: 3 | Status: SHIPPED | OUTPATIENT
Start: 2025-03-12

## 2025-04-14 NOTE — PROGRESS NOTES
[facility]  Section of General Neurology  Return Patient  Virtual Visit    Alisia Garcia MRN# [id number]   Age: 54 year old YOB: 1970          Assessment and Plan:   Assessment:  Alisia Garcia is a pleasant 54 year old female seen in follow up today for migraine headaches.  They had worsened but have improved to an extent with higher topiramate dosing.  Discussed risk/benefit of topamax, future options (nortriptyline --effective but potential overlap with selective serotonin reuptake inhibitor vs gabapentin --lower risk profile) before we could think about trialing CGRP injectables such as ajovy/aimovig or emgality which we discussed/discussed criteria for.     Plan:  Continue topamax --change slightly to 100  at bedtime   Ubrelvy 100 mg rescue  Zomig stop gap if Ubrelvy fails  Magnesium oxide 400 mg Riboflavin (vitamin b2) 400 mg daily  Gabapentin or nortriptyline next options as above  Ajovy or other injections good options thereafter potentially   Follow up in 6 months, sooner with any issues questions or changes    Video data: 324-338 PM  Used: gio  Pt location- work  Provider location: on site     Sergey Crawford MD   of Neurology   [facility]      Interval history:     Doing better with higher dose of topamax  150/150 right now, topamax, trial 100/200.    Still can go on for 2-3 days at a time  Ubrelvy works  Zomig for break throughs beyond this  Nortriptyline 10 mg   Gabapentin another future options     A/P at last visit  Alisia Garcia is a pleasant 53 year old female seen in follow up today for migraine headaches. They remain a bit up and down, worse for a few months then later improved potentially related to stress, elevation and/or travel.  We discussed future options including nortriptyline which was previously broached vs increasing topamax further.  We mutually think she is doing well enough to not luda  with our regimen at this time but she will reach out should she want to increase topamax e.g. further in between visits.        Plan:  Continue:  Topiramate 100 mg BID  Magnesium oxide 400 mg Riboflavin (vitamin b2) 400 mg daily  Ubrelvy 100 mg PRN  OK to use OTC as need options on same day as Ubrelvy if needed, no more than 3-4 days out of the week ideally     Future options  Higher on topamax (to uptitrate to 100/150 then 150/150 if need be)  Addition of nortriptyline as previously speculated     To follow up in 6 months, sooner with any issues changes or questions       Past Medical History:     Patient Active Problem List   Diagnosis    Migraine Headache    ACL tear    Acute sinusitis     No past medical history on file.     Past Surgical History:     Past Surgical History:   Procedure Laterality Date    BIOPSY BREAST Left     cyst aspiration    TOTAL VAGINAL HYSTERECTOMY  2009    ovaries in place    XR KNEE SURGERY EDWARD LEFT      ACL        Social History:     Social History     Tobacco Use    Smoking status: Former     Current packs/day: 0.00     Types: Cigarettes     Quit date: 1994     Years since quittin.3    Smokeless tobacco: Never    Tobacco comments:     : quit 29 yrs ago   Vaping Use    Vaping status: Never Used   Substance Use Topics    Alcohol use: Yes     Alcohol/week: 1.0 standard drink of alcohol     Comment: Rarely    Drug use: Never        Family History:     Family History   Problem Relation Age of Onset    Cancer Mother         dx with metastatic small cell lung cancer 2019 &  6 weeks later 3/15/19; tobacco use    Diabetes Father     No Known Problems Sister     Glaucoma Maternal Grandmother     Heart Disease Maternal Grandfather     No Known Problems Paternal Grandmother     No Known Problems Paternal Grandfather     Breast Cancer Maternal Aunt 38        maternal half sibling        Medications:     Current Outpatient Medications   Medication Sig Dispense Refill     escitalopram (LEXAPRO) 20 MG tablet Take 1.5 tablets (30 mg) by mouth daily 135 tablet 3    estradiol (VIVELLE-DOT) 0.05 MG/24HR bi-weekly patch APPLY 1 PATCH EXTERNALLY TO THE SKIN 2 TIMES A WEEK 24 patch 3    hydrOXYzine HCl (ATARAX) 25 MG tablet TAKE ONE TABLET BY MOUTH EVERY 6 HOURS AS NEEDED FOR ANXIETY OR SLEEP 90 tablet 1    magnesium oxide (MAG-OX) 400 MG tablet Take 1 tablet (400 mg) by mouth daily 90 tablet 1    methylPREDNISolone (MEDROL DOSEPAK) 4 MG tablet therapy pack Follow Package Directions 21 tablet 0    MULTIVITAMIN ORAL [MULTIVITAMIN ORAL] Take 1 capsule by mouth daily.      ondansetron (ZOFRAN) 4 MG tablet [ONDANSETRON (ZOFRAN) 4 MG TABLET] TAKE 1 TABLET BY MOUTH EVERY 8 HOURS AS NEEDED FOR NAUSEA OR VOMITING 30 tablet 11    Riboflavin 400 MG TABS Take 400 mg by mouth daily 90 tablet 1    topiramate (TOPAMAX) 100 MG tablet Take 1.5 tablets (150 mg) by mouth 2 times daily. 270 tablet 1    ubrogepant (UBRELVY) 100 MG tablet Take 1 tablet (100 mg) by mouth at onset of headache (bad migraine headache) 9 tablet 11    ZOLMitriptan (ZOMIG) 5 MG tablet TAKE 1 TABLET BY MOUTH EVERY 2 HOURS AS NEEDED FOR MIGRAINE. MAX OF 2 TABLETS PER DAY 36 tablet 3     No current facility-administered medications for this visit.        Allergies:   No Known Allergies     Review of Systems:   As noted above     Physical Exam:   General: Seated comfortably in no acute distress.  Neurologic:     Mental Status: Fully alert, attentive and oriented. Speech clear and fluent, no paraphasic errors.     Cranial Nerves: Facial movements symmetric. Hearing not formally tested but intact to conversation.  No dysarthria.     Motor: No tremors or other abnormal movements observed.      Sensory:Not able to be tested virtually           Data: Pertinent prior to visit   Imaging Previously WNL  as noted previously                  The total time of this encounter today amounted to 30 minutes in total. This time included time spent with  the patient, prep work, ordering tests, and performing post visit documentation.    The longitudinal plan of care for migraine headaches was addressed during this visit. Due to the added complexity in care, I will continue to support Ms Garcia in the subsequent management of this condition(s) and with the ongoing continuity of care of this condition(s).

## 2025-04-15 ENCOUNTER — VIRTUAL VISIT (OUTPATIENT)
Dept: NEUROLOGY | Facility: CLINIC | Age: 55
End: 2025-04-15
Payer: COMMERCIAL

## 2025-04-15 DIAGNOSIS — G43.819 OTHER MIGRAINE WITHOUT STATUS MIGRAINOSUS, INTRACTABLE: Primary | ICD-10-CM

## 2025-04-15 PROCEDURE — 98006 SYNCH AUDIO-VIDEO EST MOD 30: CPT | Performed by: STUDENT IN AN ORGANIZED HEALTH CARE EDUCATION/TRAINING PROGRAM

## 2025-04-15 NOTE — PATIENT INSTRUCTIONS
Continue topamax 100/200  Ubrelvy rescue  Zomig stop gap if Ubrelvy fails  Magnesium oxide 400 mg Riboflavin (vitamin b2) 400 mg daily  Gabapentin or nortriptyline next  Ajovy or injections good options thereafter potentially   Follow up in 6 months, sooner with any issues questions or changes

## 2025-04-15 NOTE — PROGRESS NOTES
"Alisia Garcia is a 54 year old female who presents for:  Chief Complaint   Patient presents with    Headache        Initial Vitals:  There were no vitals taken for this visit. Estimated body mass index is 20.14 kg/m  as calculated from the following:    Height as of 8/9/24: 1.664 m (5' 5.5\").    Weight as of 8/9/24: 55.7 kg (122 lb 14.4 oz).. There is no height or weight on file to calculate BSA. BP completed using cuff size: NA (Not Taken)    Xenia Tavarez    "

## 2025-04-15 NOTE — LETTER
4/15/2025      Alisia Garica  [address] [place] [id number]      Dear Colleague,    Thank you for referring your patient, Alisia Garcia, to the [facility]. Please see a copy of my visit note below.    [facility]  Section of General Neurology  Return Patient  Virtual Visit    Alisia Garcia MRN# [id number]   Age: 54 year old YOB: 1970          Assessment and Plan:   Assessment:  Alisia Garcia is a pleasant 54 year old female seen in follow up today for migraine headaches.  They had worsened but have improved to an extent with higher topiramate dosing.  Discussed risk/benefit of topamax, future options (nortriptyline --effective but potential overlap with selective serotonin reuptake inhibitor vs gabapentin --lower risk profile) before we could think about trialing CGRP injectables such as ajovy/aimovig or emgality which we discussed/discussed criteria for.     Plan:  Continue topamax --change slightly to 100  at bedtime   Ubrelvy 100 mg rescue  Zomig stop gap if Ubrelvy fails  Magnesium oxide 400 mg Riboflavin (vitamin b2) 400 mg daily  Gabapentin or nortriptyline next options as above  Ajovy or other injections good options thereafter potentially   Follow up in 6 months, sooner with any issues questions or changes    Video data: 324-338 PM  Used: [place] location- work  Provider location: on site     Sergey Crawford MD   of Neurology   [facility]      Interval history:     Doing better with higher dose of topamax  150/150 right now, topamax, trial 100/200.    Still can go on for 2-3 days at a time  Ubrelvy works  Zomig for break throughs beyond this  Nortriptyline 10 mg   Gabapentin another future options     A/P at last visit  Alisia Garcia is a pleasant 53 year old female seen in follow up today for migraine headaches. They remain a bit up and down, worse for a few months  then later improved potentially related to stress, elevation and/or travel.  We discussed future options including nortriptyline which was previously broached vs increasing topamax further.  We mutually think she is doing well enough to not luda with our regimen at this time but she will reach out should she want to increase topamax e.g. further in between visits.        Plan:  Continue:  Topiramate 100 mg BID  Magnesium oxide 400 mg Riboflavin (vitamin b2) 400 mg daily  Ubrelvy 100 mg PRN  OK to use OTC as need options on same day as Ubrelvy if needed, no more than 3-4 days out of the week ideally     Future options  Higher on topamax (to uptitrate to 100/150 then 150/150 if need be)  Addition of nortriptyline as previously speculated     To follow up in 6 months, sooner with any issues changes or questions       Past Medical History:     Patient Active Problem List   Diagnosis     Migraine Headache     ACL tear     Acute sinusitis     No past medical history on file.     Past Surgical History:     Past Surgical History:   Procedure Laterality Date     BIOPSY BREAST Left 2008    cyst aspiration     TOTAL VAGINAL HYSTERECTOMY  2009    ovaries in place     XR KNEE SURGERY EDWARD LEFT      ACL        Social History:     Social History     Tobacco Use     Smoking status: Former     Current packs/day: 0.00     Types: Cigarettes     Quit date: 1994     Years since quittin.3     Smokeless tobacco: Never     Tobacco comments:     : quit 29 yrs ago   Vaping Use     Vaping status: Never Used   Substance Use Topics     Alcohol use: Yes     Alcohol/week: 1.0 standard drink of alcohol     Comment: Rarely     Drug use: Never        Family History:     Family History   Problem Relation Age of Onset     Cancer Mother         dx with metastatic small cell lung cancer 2019 &  6 weeks later 3/15/19; tobacco use     Diabetes Father      No Known Problems Sister      Glaucoma Maternal Grandmother      Heart Disease  Maternal Grandfather      No Known Problems Paternal Grandmother      No Known Problems Paternal Grandfather      Breast Cancer Maternal Aunt 38        maternal half sibling        Medications:     Current Outpatient Medications   Medication Sig Dispense Refill     escitalopram (LEXAPRO) 20 MG tablet Take 1.5 tablets (30 mg) by mouth daily 135 tablet 3     estradiol (VIVELLE-DOT) 0.05 MG/24HR bi-weekly patch APPLY 1 PATCH EXTERNALLY TO THE SKIN 2 TIMES A WEEK 24 patch 3     hydrOXYzine HCl (ATARAX) 25 MG tablet TAKE ONE TABLET BY MOUTH EVERY 6 HOURS AS NEEDED FOR ANXIETY OR SLEEP 90 tablet 1     magnesium oxide (MAG-OX) 400 MG tablet Take 1 tablet (400 mg) by mouth daily 90 tablet 1     methylPREDNISolone (MEDROL DOSEPAK) 4 MG tablet therapy pack Follow Package Directions 21 tablet 0     MULTIVITAMIN ORAL [MULTIVITAMIN ORAL] Take 1 capsule by mouth daily.       ondansetron (ZOFRAN) 4 MG tablet [ONDANSETRON (ZOFRAN) 4 MG TABLET] TAKE 1 TABLET BY MOUTH EVERY 8 HOURS AS NEEDED FOR NAUSEA OR VOMITING 30 tablet 11     Riboflavin 400 MG TABS Take 400 mg by mouth daily 90 tablet 1     topiramate (TOPAMAX) 100 MG tablet Take 1.5 tablets (150 mg) by mouth 2 times daily. 270 tablet 1     ubrogepant (UBRELVY) 100 MG tablet Take 1 tablet (100 mg) by mouth at onset of headache (bad migraine headache) 9 tablet 11     ZOLMitriptan (ZOMIG) 5 MG tablet TAKE 1 TABLET BY MOUTH EVERY 2 HOURS AS NEEDED FOR MIGRAINE. MAX OF 2 TABLETS PER DAY 36 tablet 3     No current facility-administered medications for this visit.        Allergies:   No Known Allergies     Review of Systems:   As noted above     Physical Exam:   General: Seated comfortably in no acute distress.  Neurologic:     Mental Status: Fully alert, attentive and oriented. Speech clear and fluent, no paraphasic errors.     Cranial Nerves: Facial movements symmetric. Hearing not formally tested but intact to conversation.  No dysarthria.     Motor: No tremors or other abnormal  "movements observed.      Sensory:Not able to be tested virtually           Data: Pertinent prior to visit   Imaging Previously WNL  as noted previously                  The total time of this encounter today amounted to 30 minutes in total. This time included time spent with the patient, prep work, ordering tests, and performing post visit documentation.    The longitudinal plan of care for migraine headaches was addressed during this visit. Due to the added complexity in care, I will continue to support Ms Garcia in the subsequent management of this condition(s) and with the ongoing continuity of care of this condition(s).      Alisia Garcia is a 54 year old female who presents for:  Chief Complaint   Patient presents with     Headache        Initial Vitals:  There were no vitals taken for this visit. Estimated body mass index is 20.14 kg/m  as calculated from the following:    Height as of 8/9/24: 1.664 m (5' 5.5\").    Weight as of 8/9/24: 55.7 kg (122 lb 14.4 oz).. There is no height or weight on file to calculate BSA. BP completed using cuff size: NA (Not Taken)    Xenia Tavarez      Again, thank you for allowing me to participate in the care of your patient.        Sincerely,        Chun Crawford MD    Electronically signed"

## 2025-05-20 ENCOUNTER — VIRTUAL VISIT (OUTPATIENT)
Dept: FAMILY MEDICINE | Facility: CLINIC | Age: 55
End: 2025-05-20
Payer: COMMERCIAL

## 2025-05-20 DIAGNOSIS — R11.0 NAUSEA: ICD-10-CM

## 2025-05-20 DIAGNOSIS — Z79.890 HORMONE REPLACEMENT THERAPY: ICD-10-CM

## 2025-05-20 DIAGNOSIS — F32.A DEPRESSION, UNSPECIFIED DEPRESSION TYPE: ICD-10-CM

## 2025-05-20 DIAGNOSIS — G43.909 MIGRAINE WITHOUT STATUS MIGRAINOSUS, NOT INTRACTABLE, UNSPECIFIED MIGRAINE TYPE: ICD-10-CM

## 2025-05-20 DIAGNOSIS — F41.9 ANXIETY: ICD-10-CM

## 2025-05-20 DIAGNOSIS — G47.00 INSOMNIA, UNSPECIFIED TYPE: ICD-10-CM

## 2025-05-20 PROBLEM — J01.90 ACUTE SINUSITIS: Status: RESOLVED | Noted: 2024-10-30 | Resolved: 2025-05-20

## 2025-05-20 PROCEDURE — 1126F AMNT PAIN NOTED NONE PRSNT: CPT | Mod: 95 | Performed by: NURSE PRACTITIONER

## 2025-05-20 PROCEDURE — 98006 SYNCH AUDIO-VIDEO EST MOD 30: CPT | Performed by: NURSE PRACTITIONER

## 2025-05-20 RX ORDER — HYDROXYZINE HYDROCHLORIDE 25 MG/1
TABLET, FILM COATED ORAL
Qty: 90 TABLET | Refills: 2 | Status: SHIPPED | OUTPATIENT
Start: 2025-05-20

## 2025-05-20 RX ORDER — ONDANSETRON 4 MG/1
TABLET, FILM COATED ORAL
Qty: 30 TABLET | Refills: 11 | Status: SHIPPED | OUTPATIENT
Start: 2025-05-20

## 2025-05-20 RX ORDER — ESCITALOPRAM OXALATE 20 MG/1
30 TABLET ORAL DAILY
Qty: 135 TABLET | Refills: 2 | Status: SHIPPED | OUTPATIENT
Start: 2025-05-20

## 2025-05-20 RX ORDER — ZOLMITRIPTAN 5 MG/1
TABLET, FILM COATED ORAL
Qty: 36 TABLET | Refills: 3 | Status: SHIPPED | OUTPATIENT
Start: 2025-05-20

## 2025-05-20 RX ORDER — ESTRADIOL 0.05 MG/D
PATCH, EXTENDED RELEASE TRANSDERMAL
Qty: 24 PATCH | Refills: 3 | Status: SHIPPED | OUTPATIENT
Start: 2025-05-20

## 2025-05-20 NOTE — PROGRESS NOTES
Alisia is a 54 year old who is being evaluated via a billable video visit.          Assessment & Plan     Anxiety  Depression, unspecified depression type  Mood is stable.  She continues escitalopram.  - escitalopram (LEXAPRO) 20 MG tablet  Dispense: 135 tablet; Refill: 2    Hormone replacement therapy  Patient continues estradiol patch.  She denies personal or family history of blood clots.  I encouraged yearly physicals.  - estradiol (VIVELLE-DOT) 0.05 MG/24HR bi-weekly patch  Dispense: 24 patch; Refill: 3    Insomnia, unspecified type  She continues hydroxyzine as needed.  - hydrOXYzine HCl (ATARAX) 25 MG tablet  Dispense: 90 tablet; Refill: 2    Nausea  She continues Zofran as needed.  - ondansetron (ZOFRAN) 4 MG tablet  Dispense: 30 tablet; Refill: 11    Migraine without status migrainosus, not intractable, unspecified migraine type  Patient is followed by neurology.  She continues topiramate daily.  She is taking Zomig and Ubrelvy as needed.  - ZOLMitriptan (ZOMIG) 5 MG tablet  Dispense: 36 tablet; Refill: 3      The longitudinal plan of care for the diagnosis(es)/condition(s) as documented were addressed during this visit. Due to the added complexity in care, I will continue to support Alisia in the subsequent management and with ongoing continuity of care.            Subjective   Alisia is a 54 year old, presenting for the following health issues:  Follow Up and Medication Follow-up        10/30/2024    11:28 AM   Additional Questions   Roomed by Justin Brownlee MA   Accompanied by Self     History of Present Illness       Reason for visit:  I am required to have a yearly visit with a provider to have my medications refilled. I have no issues or concerns that I need addressed, I just need to make sure my meds continue to get refilled as they are.   She is taking medications regularly.        Patient presents today for medication management.  She is taking escitalopram 20 mg daily for anxiety and depression.   "Patient states her mood is stable.  She denies thoughts of suicide.  She feels well supported.  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 She takes hydroxyzine 2-3 times per week for anxiety or insomnia.  She is prescribed the estradiol patch to assist with hormone therapy status post hysterectomy at the age of 40.  She denies hot flashes, weight fluctuations, mood swings.  She denies personal or family history of blood clots.  Patient has a history of migraine headaches and is followed by neurology.  She is taking topiramate 150 mg twice daily.  She uses Ubrelvy and Zomig as needed. She continues to experience headaches.  She may consider using Emgality in the future.     Review of Systems  Constitutional, HEENT, cardiovascular, pulmonary, GI, , musculoskeletal, neuro, skin, endocrine and psych systems are negative, except as otherwise noted.      Objective    Vitals - Patient Reported  Weight (Patient Reported): 55.3 kg (122 lb)  Height (Patient Reported): 166.4 cm (5' 5.5\")  BMI (Based on Pt Reported Ht/Wt): 19.99  Pain Score: No Pain (0)        Physical Exam   GENERAL: alert and no distress  EYES: Eyes grossly normal to inspection.  No discharge or erythema, or obvious scleral/conjunctival abnormalities.  RESP: No audible wheeze, cough, or visible cyanosis.    SKIN: Visible skin clear. No significant rash, abnormal pigmentation or lesions.  NEURO: Cranial nerves grossly intact.  Mentation and speech appropriate for age.  PSYCH: Appropriate affect, tone, and pace of words        Video-Visit Details    Type of service:  Video Visit   Originating Location (pt. Location): workplace at [employer] in [place], [place]     Distant Location (provider location):  On-site  Platform used for Video Visit: [place]  Signed Electronically by: TOM Powell CNP    "

## 2025-05-27 DIAGNOSIS — G43.819 OTHER MIGRAINE WITHOUT STATUS MIGRAINOSUS, INTRACTABLE: ICD-10-CM

## 2025-05-27 RX ORDER — TOPIRAMATE 100 MG/1
150 TABLET, FILM COATED ORAL 2 TIMES DAILY
Qty: 270 TABLET | Refills: 1 | Status: SHIPPED | OUTPATIENT
Start: 2025-05-27

## 2025-05-29 DIAGNOSIS — G43.819 OTHER MIGRAINE WITHOUT STATUS MIGRAINOSUS, INTRACTABLE: Primary | ICD-10-CM

## 2025-05-29 RX ORDER — METHYLPREDNISOLONE 4 MG/1
TABLET ORAL
Qty: 21 TABLET | Refills: 0 | Status: SHIPPED | OUTPATIENT
Start: 2025-05-29

## 2025-06-16 DIAGNOSIS — G43.819 OTHER MIGRAINE WITHOUT STATUS MIGRAINOSUS, INTRACTABLE: Primary | ICD-10-CM

## 2025-06-16 RX ORDER — GABAPENTIN 300 MG/1
300 CAPSULE ORAL 3 TIMES DAILY
Qty: 270 CAPSULE | Refills: 1 | Status: SHIPPED | OUTPATIENT
Start: 2025-06-16

## 2025-06-30 ENCOUNTER — MYC REFILL (OUTPATIENT)
Dept: FAMILY MEDICINE | Facility: CLINIC | Age: 55
End: 2025-06-30
Payer: COMMERCIAL

## 2025-06-30 DIAGNOSIS — R11.0 NAUSEA: ICD-10-CM

## 2025-06-30 RX ORDER — ONDANSETRON 4 MG/1
TABLET, FILM COATED ORAL
Qty: 30 TABLET | Refills: 11 | OUTPATIENT
Start: 2025-06-30

## 2025-07-15 DIAGNOSIS — G47.00 INSOMNIA, UNSPECIFIED TYPE: ICD-10-CM

## 2025-07-15 RX ORDER — HYDROXYZINE HYDROCHLORIDE 25 MG/1
TABLET, FILM COATED ORAL
Qty: 90 TABLET | Refills: 2 | Status: SHIPPED | OUTPATIENT
Start: 2025-07-15

## 2025-07-23 DIAGNOSIS — G43.819 OTHER MIGRAINE WITHOUT STATUS MIGRAINOSUS, INTRACTABLE: ICD-10-CM

## 2025-07-23 NOTE — TELEPHONE ENCOUNTER
Upon further review, med request should be sent to neurology. Refused with note to pharmacy.     Russ RAMOS RN 7/23/2025 at 9:52 AM